# Patient Record
Sex: FEMALE | Race: WHITE | Employment: OTHER | URBAN - METROPOLITAN AREA
[De-identification: names, ages, dates, MRNs, and addresses within clinical notes are randomized per-mention and may not be internally consistent; named-entity substitution may affect disease eponyms.]

---

## 2017-04-24 ENCOUNTER — TELEPHONE (OUTPATIENT)
Dept: PEDIATRICS | Facility: CLINIC | Age: 62
End: 2017-04-24

## 2017-04-24 NOTE — LETTER
[facility]  [address]  [place] [address]  [phone]      May 5, 2017    Gi Zhao                                                                                                                                                       [address]  [place] [address]              Dear Gi,    This letter is to remind you that you are due for your mammogram, colonoscopy, cholesterol check and annual exam.    Please call the [facility] for an appointment at [phone].    If you have already had these services with another provider please call our office or send us a [employer] message with this information so we can update our records.    If you have transferred your care elsewhere, please contact our office and we would be happy to forward your records. If you have any financial concerns regarding this appointment, please call so that we can discuss this further.      Sincerely,    Sweta Gonzalez CMA

## 2017-04-24 NOTE — TELEPHONE ENCOUNTER
Panel Management Review      Patient has the following on her problem list: None      Composite cancer screening  Chart review shows that this patient is due/due soon for the following Mammogram and Colonoscopy  Summary:    Patient is due/failing the following:   COLONOSCOPY, LDL, MAMMOGRAM and PHYSICAL    Action needed:   Patient needs office visit for mammogram, colonoscopy, cholesterol check & physical.    Type of outreach:    Phone, left message for patient to call back.     Questions for provider review:    None                                                                                                                                    Sweta Gonzalez CMA    Chart routed to Care Team .

## 2017-05-01 NOTE — TELEPHONE ENCOUNTER
Called and left VM for patient to contact clinic.  Patient needs physical, cholesterol check, mammogram & colonoscopy.  Sweta Gonzalez, CMA

## 2017-09-05 ENCOUNTER — TELEPHONE (OUTPATIENT)
Dept: PEDIATRICS | Facility: CLINIC | Age: 62
End: 2017-09-05

## 2017-09-05 NOTE — LETTER
September 14, 2017      Gi Zhao  [address]  [place] [address]        Dear Gi,       We care about your health and have reviewed your health plan including your medical conditions, medications, and lab results.  Based on this review, it is recommended that you follow up regarding the following health topic(s):  -Breast Cancer Screening  -Colon Cancer Screening  -Wellness (Physical) Visit     We recommend you take the following action(s):  -schedule a WELLNESS (Physical) APPOINTMENT.  We will perform the following labs: Lipids (fasting cholesterol - nothing to eat except water and/or meds for 8-10 hours).     Please call us at the [facility] - [phone] (or use [employer]) to address the above recommendations.     Thank you for trusting [facility] and we appreciate the opportunity to serve you.  We look forward to supporting your healthcare needs in the future.    Healthy Regards,    Your Health Care Team  [facility]      Sincerely,    CARLOTTA Rausch CNP

## 2017-09-05 NOTE — TELEPHONE ENCOUNTER
Panel Management Review      Patient has the following on her problem list: None      Composite cancer screening  Chart review shows that this patient is due/due soon for the following Mammogram, Colonoscopy and Lipid, physical  Summary:    Patient is due/failing the following:   COLONOSCOPY, LDL, MAMMOGRAM, and PHYSICAL    Action needed:   Patient needs office visit for mammo, colon, physical,lipid.    Type of outreach:  2  Phone, left message for patient to call back.     Questions for provider review:    None                                                                                                                                    Lucia Bah CMA ([facility])       Chart routed to pool A

## 2017-09-11 NOTE — TELEPHONE ENCOUNTER
Called and left VM for patient to contact clinic.  Patient needs cholesterol check, mammo, colon/FIT and physical.  Sweta Gonzalez, CMA

## 2017-09-26 ENCOUNTER — TELEPHONE (OUTPATIENT)
Dept: PEDIATRICS | Facility: CLINIC | Age: 62
End: 2017-09-26

## 2020-10-24 ENCOUNTER — APPOINTMENT (OUTPATIENT)
Dept: CT IMAGING | Facility: CLINIC | Age: 65
End: 2020-10-24
Attending: EMERGENCY MEDICINE
Payer: COMMERCIAL

## 2020-10-24 ENCOUNTER — HOSPITAL ENCOUNTER (EMERGENCY)
Facility: CLINIC | Age: 65
Discharge: HOME | End: 2020-10-25
Attending: EMERGENCY MEDICINE | Admitting: EMERGENCY MEDICINE
Payer: COMMERCIAL

## 2020-10-24 DIAGNOSIS — S01.81XA LACERATION OF FOREHEAD, INITIAL ENCOUNTER: ICD-10-CM
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
## 2020-10-24 LAB
ANION GAP SERPL CALCULATED.3IONS-SCNC: 6 MMOL/L (ref 3–14)
ANISOCYTOSIS BLD QL SMEAR: SLIGHT
BASE DEFICIT BLDV-SCNC: 1 MMOL/L
BASOPHILS # BLD AUTO: 0.1 10E9/L (ref 0–0.2)
BASOPHILS NFR BLD AUTO: 0.8 %
BUN SERPL-MCNC: 17 MG/DL (ref 7–30)
CALCIUM SERPL-MCNC: 8.4 MG/DL (ref 8.5–10.1)
CHLORIDE SERPL-SCNC: 109 MMOL/L (ref 94–109)
CO2 SERPL-SCNC: 24 MMOL/L (ref 20–32)
CREAT SERPL-MCNC: 1.06 MG/DL (ref 0.52–1.04)
DIFFERENTIAL METHOD BLD: ABNORMAL
EOSINOPHIL # BLD AUTO: 0.2 10E9/L (ref 0–0.7)
EOSINOPHIL NFR BLD AUTO: 2.8 %
ERYTHROCYTE [DISTWIDTH] IN BLOOD BY AUTOMATED COUNT: 16.9 % (ref 10–15)
GFR SERPL CREATININE-BSD FRML MDRD: 55 ML/MIN/{1.73_M2}
GLUCOSE SERPL-MCNC: 125 MG/DL (ref 70–99)
HCO3 BLDV-SCNC: 25 MMOL/L (ref 21–28)
HCT VFR BLD AUTO: 36.4 % (ref 35–47)
HGB BLD-MCNC: 10.3 G/DL (ref 11.7–15.7)
IMM GRANULOCYTES # BLD: 0 10E9/L (ref 0–0.4)
IMM GRANULOCYTES NFR BLD: 0.4 %
LYMPHOCYTES # BLD AUTO: 1.3 10E9/L (ref 0.8–5.3)
LYMPHOCYTES NFR BLD AUTO: 16.6 %
MCH RBC QN AUTO: 23.9 PG (ref 26.5–33)
MCHC RBC AUTO-ENTMCNC: 28.3 G/DL (ref 31.5–36.5)
MCV RBC AUTO: 85 FL (ref 78–100)
MONOCYTES # BLD AUTO: 0.6 10E9/L (ref 0–1.3)
MONOCYTES NFR BLD AUTO: 7.7 %
NEUTROPHILS # BLD AUTO: 5.4 10E9/L (ref 1.6–8.3)
NEUTROPHILS NFR BLD AUTO: 71.7 %
NRBC # BLD AUTO: 0 10*3/UL
NRBC BLD AUTO-RTO: 0 /100
O2/TOTAL GAS SETTING VFR VENT: 0 %
OVALOCYTES BLD QL SMEAR: SLIGHT
PCO2 BLDV: 43 MM HG (ref 40–50)
PH BLDV: 7.36 PH (ref 7.32–7.43)
PLATELET # BLD AUTO: 198 10E9/L (ref 150–450)
PLATELET # BLD EST: ABNORMAL 10*3/UL
PO2 BLDV: 44 MM HG (ref 25–47)
POTASSIUM SERPL-SCNC: 4.1 MMOL/L (ref 3.4–5.3)
RBC # BLD AUTO: 4.31 10E12/L (ref 3.8–5.2)
SODIUM SERPL-SCNC: 139 MMOL/L (ref 133–144)
WBC # BLD AUTO: 7.6 10E9/L (ref 4–11)

## 2020-10-24 PROCEDURE — 85025 COMPLETE CBC W/AUTO DIFF WBC: CPT | Performed by: EMERGENCY MEDICINE

## 2020-10-24 PROCEDURE — 80048 BASIC METABOLIC PNL TOTAL CA: CPT | Performed by: EMERGENCY MEDICINE

## 2020-10-24 PROCEDURE — 82803 BLOOD GASES ANY COMBINATION: CPT | Performed by: EMERGENCY MEDICINE

## 2020-10-24 PROCEDURE — 99284 EMERGENCY DEPT VISIT MOD MDM: CPT

## 2020-10-24 PROCEDURE — 12016 RPR FE/E/EN/L/M 12.6-20.0 CM: CPT

## 2020-10-24 PROCEDURE — 70450 CT HEAD/BRAIN W/O DYE: CPT

## 2020-10-24 RX ORDER — METHYLCELLULOSE 4000CPS 30 %
POWDER (GRAM) MISCELLANEOUS
Status: DISCONTINUED
Start: 2020-10-24 | End: 2020-10-25 | Stop reason: HOSPADM

## 2020-10-24 ASSESSMENT — ENCOUNTER SYMPTOMS: WOUND: 1

## 2020-10-24 NOTE — ED AVS SNAPSHOT
[facility]  [address]  [facility] [address]  Phone: [phone]  Fax: [phone]                                    Gi Zhao   MRN: [id number]    Department: [facility]   Date of Visit: 10/24/2020           After Visit Summary Signature Page    I have received my discharge instructions, and my questions have been answered. I have discussed any challenges I see with this plan with the nurse or doctor.    ..........................................................................................................................................  Patient/Patient Representative Signature      ..........................................................................................................................................  Patient Representative Print Name and Relationship to Patient    ..................................................               ................................................  Date                                   Time    ..........................................................................................................................................  Reviewed by Signature/Title    ...................................................              ..............................................  Date                                               Time          22EPIC Rev 08/18

## 2020-10-25 VITALS
TEMPERATURE: 97.9 F | SYSTOLIC BLOOD PRESSURE: 148 MMHG | OXYGEN SATURATION: 100 % | HEART RATE: 93 BPM | DIASTOLIC BLOOD PRESSURE: 59 MMHG | RESPIRATION RATE: 20 BRPM

## 2020-10-25 RX ORDER — LIDOCAINE HYDROCHLORIDE AND EPINEPHRINE 10; 10 MG/ML; UG/ML
INJECTION, SOLUTION INFILTRATION; PERINEURAL
Status: DISCONTINUED
Start: 2020-10-25 | End: 2020-10-25 | Stop reason: HOSPADM

## 2020-10-25 NOTE — ED PROVIDER NOTES
History     Chief Complaint:  Fall and Head Laceration       HPI   Gi Zhao is a 65 year old female who presents after a fall. The patient was sitting at her dining room table tonight when she fell asleep and fell forward hitting her head on the table. She denies losing consciousness. The patient sustained a laceration to her forehead. EMS was called to the patient's home and she was subsequently transferred to the ER. She is not on blood thinners. She denies having any pain.     Allergies:  Oxycontin [Oxycodone]     Medications:    Medications reviewed. No current medications.      Past Medical History:    Medical history reviewed. No pertinent medical history.    Past Surgical History:    Arthroplasty knee   Closed reduction lower extremity   Mandible surgery   Repair retinacular   Repair tendon quadriceps    Family History:    Family history reviewed. No pertinent family history.      Social History:  Smoking Status: Never Smoker  Smokeless Tobacco: Never Used  Alcohol Use: No  Drug Use: No  PCP: Johnny Murphy     Review of Systems   Skin: Positive for wound (laceration to the forehead).   All other systems reviewed and are negative.        Physical Exam     Patient Vitals for the past 24 hrs:   BP Temp Temp src Pulse Resp SpO2   10/24/20 2319 114/69 -- -- -- -- 100 %   10/24/20 2245 -- -- -- -- -- 100 %   10/24/20 2230 (!) 141/71 -- -- 83 -- 100 %   10/24/20 2218 (!) 167/77 97.9  F (36.6  C) Oral 86 20 100 %        Physical Exam  Gen: Morbidly obese  HEENT: 9 cm horizontally oriented wound across the forehead inferior to this there is a vertically oriented 5 cm wound, no palpable underlying bony abnormality, no foreign body when examined through a bloodless field.  mmm, no rhinorrhea.  Pupils 4 mm reactive bilaterally, extraocular motor intact.  Visual acuity at baseline  Neck: supple, no abnormal swelling, no midline tenderness and painless range of motion  Lungs:  CTAB,  no resp distress  CV: rrr,  no m/r/g, ppi  Abd: soft, nontender, nondistended, no rebound/masses/guarding/hsm  Ext: no peripheral edema, skeletal survey unremarkable for significant bony tenderness palpation.  Chronic venous stasis changes in the legs bilaterally with 2+ pitting edema.  Skin: warm, dry, well perfused, no rashes/bruising/lesions on exposed skin  Neuro: alert, MAEE, no gross motor or sensory deficits, nerves II to XII intact and symmetric psych: Normal mood, normal affect        Emergency Department Course     Imaging:  Radiology findings were communicated with the patient who voiced understanding of the findings.    Head CT w/o contrast  Final Result  IMPRESSION:  1.  No acute intracranial hemorrhage or calvarial fracture.  Reading per radiology     Laboratory:  Laboratory findings were communicated with the patient who voiced understanding of the findings.    Procedures     Laceration Repair        LACERATION:  A complex clean 9 cm laceration and 5cm      LOCATION: Forehead, horizontally oriented laceration is 9 cm vertically oriented is 5      FUNCTION:  Distally sensation, circulation and motor are intact.      ANESTHESIA: Topical anesthesia and an additional 3 cc 1% lidocaine with epi      PREPARATION:  Irrigation with Normal Saline      DEBRIDEMENT:  no debridement and wound explored, no foreign body found      CLOSURE: 1 layer closure, 9 cm horizontal wound closed with 5-0 fast-absorbing gut sutures x9.  Vertically oriented 5 cm wound closed in 1 layer with 5-0 fast-absorbing gut 7 sutures     Interventions:  Medications   lidocaine/EPINEPHrine/tetracaine (LET) solution KIT (has no administration in time range)   methylcellulose powder (has no administration in time range)       Emergency Department Course:  Past medical records, nursing notes, and vitals reviewed.    2220 I performed an exam of the patient as documented above.    IV was inserted and blood was drawn for laboratory testing, results above.     The patient  was sent for imaging while in the emergency department, results above.             Impression & Plan   Covid-19  Gi Zhao was evaluated during a global COVID-19 pandemic, which necessitated consideration that the patient might be at risk for infection with the SARS-CoV-2 virus that causes COVID-19.   Applicable protocols for evaluation were followed during the patient's care.   COVID-19 was considered as part of the patient's evaluation.     Medical Decision Making:  Gi Zhao is a 65 year old female who presents to the emergency department today with forehead lacerations after falling asleep at a table and hitting her head.  CT negative for skull fracture or intracranial hemorrhage.  There is no ocular injury and her skeletal survey is otherwise unremarkable.  Basic blood tests and VBG are unremarkable.  She feels safe at home where she lives with her son and was discharged back home in a wheelchair van.  Educated on wound care.  Sutures are dissolvable.      Discharge Diagnosis:    ICD-10-CM    1. Fall, initial encounter  W19.XXXA    2. Laceration of forehead, initial encounter  S01.81XA        Disposition:  Discharged to home.    Discharge Medications:  Discharge Medication List as of 10/25/2020  1:43 AM          Scribe Disclosure:  I, Pasha Gaines, am serving as a scribe at 10:20 PM on 10/24/2020 to document services personally performed by Guzman Wilde MD based on my observations and the provider's statements to me.      10/24/2020   Guzman Wilde MD Walker, Jerome Richard, MD  10/25/20 0316

## 2020-10-25 NOTE — ED TRIAGE NOTES
"Pt arrives via [facility] from home. Per EMS, pt was sitting at jorge dining room table when she \"fell asleep\" and fell forward, hitting her head on the table. Denies loss of consciousness but has a large laceration to th forehead (mild bleeding is controlled at this time with washcloth). Pt does not take blood thinners. EMS did not start IV due to pt concern for billing and cost of care.   EMS also reports concern for \"social issues.\" State pt lives with some and is dependent on him for care. Unable to ambulate well at home even with walker. Per EMS, pt has not been to a doctor in years,.   On arrival, pt alert and answering questions. Resp even and unlabored. Skin pink, warm, and dry, ED MD at bedside on arrival.  "

## 2020-10-25 NOTE — ED NOTES
Bed: ED26  Expected date:   Expected time:   Means of arrival: Ambulance  Comments:  EDIE. 65f. Fall

## 2020-10-25 NOTE — DISCHARGE INSTRUCTIONS
Please make an appointment to follow up with your primary care provider or the emergency department in 5-7 days as needed    Stitches are absorbable and will fall out on their own usually around day 5-7.  If the stitches have not fallen out by day 7, return to clinic or ED to have them removed.      Return to ER immediately if you develop: signs of a wound infection (RED/SWOLLEN/DRAINS PUS LIKE A PIMPLE) OR you have any other concerns about your health.    Keep dressings clean    Use antibiotic ointment over wound for first 3 days        Discharge Instructions  Laceration (Cut)    You were seen today for a laceration (cut).  Your doctor examined your laceration for any problems such a buried foreign body (like glass, a splinter, or gravel), or injury to blood vessels, tendons, and nerves.  Your doctor may have also rinsed and/or scrubbed your laceration to help prevent an infection.  Your laceration may have been closed with glue, staples or sutures (stitches).      It may not be possible to find all problems with your laceration on the first visit, and we can't always prevent infections.  Antibiotics are only given when the benefit is more than the risk, and don't prevent all infections. Some lacerations are too high risk to close, and are left open to heal.  All lacerations, no matter how expertly repaired, will cause scarring.    Return to the Emergency Department right away if:  You have more redness, swelling, pain, drainage (pus), a bad smell, or red streaking from your laceration.    You have a fever of 101oF or more.  You have bleeding that you can t stop at home. If your cut starts to bleed, hold pressure on the bleeding area with a clean cloth or put pressure over the bandage.  If the bleeding doesn t stop after using constant pressure for 30 minutes, you should return to the Emergency Department for further treatment.  An area past the laceration is cool, pale, or blue compared with the other side, or has  a slower return of color when squeezed.  Your dressing seems too tight or starts to get uncomfortable or painful.  You have loss of normal function or use of an area, such as being unable to straighten or bend a finger normally.  You have a numb area past the laceration.    Return to the Emergency Department or see your regular doctor if:  The laceration starts to come open.   You have something coming out of the cut or a feeling that there is something in the laceration.  Your wound will not heal, or keeps breaking open. There can always be glass, wood, dirt or other things in any wound.  They won t always show up even on x-rays.  If a wound doesn t heal, this may be why, and it is important to follow-up with your regular doctor    Home Care:  Take your dressing off in 12 hours, or as instructed by your doctor, to check your laceration. Remove the dressing sooner if it seems too tight or painful, or if it is getting numb, tingly, or pale past the dressing.  Gently wash your laceration 2 times a day with clean cloth and soap.   It is okay to shower, but do not let the laceration soak in water.    If your laceration was closed with wound adhesive or strips: pat it dry and leave it open to the air.   For all other repairs: after you wash your laceration, or at least 2 times a day, apply bacitracin or other antibiotic ointment to the laceration, then cover it with a band-aid or gauze.  Keep the laceration clean. Wear gloves or other protective clothing if you are around dirt.    Scars:  To help minimize scarring:  Wear sunscreen over the healed laceration when out in the sun.  Massage the area regularly.  You may use Vitamin E Oil.  Wait a year.  Most scars will start to fade within a year.      Remember that you can always come back to the Emergency Department if you are not able to see your normal doctor in the amount of time listed above, if you get any new symptoms, or if there is anything that worries you.

## 2020-11-03 ENCOUNTER — PATIENT OUTREACH (OUTPATIENT)
Dept: CARE COORDINATION | Facility: CLINIC | Age: 65
End: 2020-11-03

## 2020-11-03 NOTE — LETTER
[facility] CARE COORDINATION  November 3, 2020    iG Zhao  [address] [place] [address]      Dear Gi,    I am a clinical product navigator that works on behalf of [facility] as a liaison between our clinical care teams and insurance health plans.  Our goal is to assure our patients have access to all of their primary and specialty care needs as well as additional system resources, such as: Diabetes Education, Medication Therapy Management, and Clinic Care Coordination, among other resources.     We noticed that you are due for your annual wellness visit.  Even if you are feeling well, we encourage you to have an annual check up and assure your health care needs stay on track.      Please contact the clinic at your convenience to schedule your annual wellness exam; if you are interested  or in need of establishing care with any specialty providers or resources including those described above, I am happy to assist you.    I look forward to helping you connect with providers within our health care system; please let me know if you have any questions.     Sincerely,    Cecile Mai RN   Clinical Product Navigator  PH: [phone]

## 2020-11-03 NOTE — PROGRESS NOTES
Clinical Product Navigator RN reviewed chart; patient on payer product coverage.  Review results: patient recently seen in ED, noted to also be overdue on preventive care exam.       UT/Voicemail       Clinical Data: Care Coordinator Outreach  Outreach attempted x 1.  Left message on patient's voicemail with call back information and requested return call.  Plan: Care Coordinator will send care coordination introduction letter with care coordinator contact information and explanation of care coordination services via mail.     No further outreaches will be done.     Cecile Mai RN/Clinical Product Navigator

## 2025-04-23 ENCOUNTER — APPOINTMENT (OUTPATIENT)
Dept: CT IMAGING | Facility: CLINIC | Age: 70
DRG: 872 | End: 2025-04-23
Attending: EMERGENCY MEDICINE
Payer: COMMERCIAL

## 2025-04-23 ENCOUNTER — APPOINTMENT (OUTPATIENT)
Dept: GENERAL RADIOLOGY | Facility: CLINIC | Age: 70
DRG: 872 | End: 2025-04-23
Attending: EMERGENCY MEDICINE
Payer: COMMERCIAL

## 2025-04-23 ENCOUNTER — HOSPITAL ENCOUNTER (INPATIENT)
Facility: CLINIC | Age: 70
DRG: 872 | End: 2025-04-23
Attending: EMERGENCY MEDICINE | Admitting: INTERNAL MEDICINE
Payer: COMMERCIAL

## 2025-04-23 DIAGNOSIS — A41.9 SEPSIS, DUE TO UNSPECIFIED ORGANISM, UNSPECIFIED WHETHER ACUTE ORGAN DYSFUNCTION PRESENT (H): ICD-10-CM

## 2025-04-23 DIAGNOSIS — I48.91 ATRIAL FIBRILLATION WITH RAPID VENTRICULAR RESPONSE (H): ICD-10-CM

## 2025-04-23 DIAGNOSIS — R53.1 WEAKNESS: ICD-10-CM

## 2025-04-23 DIAGNOSIS — N30.01 ACUTE CYSTITIS WITH HEMATURIA: ICD-10-CM

## 2025-04-23 LAB
ALBUMIN UR-MCNC: 20 MG/DL
ANION GAP SERPL CALCULATED.3IONS-SCNC: 14 MMOL/L (ref 7–15)
APPEARANCE UR: CLEAR
BACTERIA #/AREA URNS HPF: ABNORMAL /HPF
BASOPHILS # BLD AUTO: 0 10E3/UL (ref 0–0.2)
BASOPHILS NFR BLD AUTO: 0 %
BILIRUB UR QL STRIP: NEGATIVE
BUN SERPL-MCNC: 16.6 MG/DL (ref 8–23)
CALCIUM SERPL-MCNC: 9.3 MG/DL (ref 8.8–10.4)
CHLORIDE SERPL-SCNC: 107 MMOL/L (ref 98–107)
COLOR UR AUTO: ABNORMAL
CREAT SERPL-MCNC: 1.17 MG/DL (ref 0.51–0.95)
EGFRCR SERPLBLD CKD-EPI 2021: 50 ML/MIN/1.73M2
EOSINOPHIL # BLD AUTO: 0 10E3/UL (ref 0–0.7)
EOSINOPHIL NFR BLD AUTO: 0 %
ERYTHROCYTE [DISTWIDTH] IN BLOOD BY AUTOMATED COUNT: 14.6 % (ref 10–15)
GLUCOSE SERPL-MCNC: 135 MG/DL (ref 70–99)
GLUCOSE UR STRIP-MCNC: NEGATIVE MG/DL
HCO3 SERPL-SCNC: 20 MMOL/L (ref 22–29)
HCT VFR BLD AUTO: 37.3 % (ref 35–47)
HGB BLD-MCNC: 12.4 G/DL (ref 11.7–15.7)
HGB UR QL STRIP: ABNORMAL
HOLD SPECIMEN: NORMAL
HOLD SPECIMEN: NORMAL
HYALINE CASTS: 1 /LPF
IMM GRANULOCYTES # BLD: 0.1 10E3/UL
IMM GRANULOCYTES NFR BLD: 0 %
KETONES UR STRIP-MCNC: ABNORMAL MG/DL
LACTATE SERPL-SCNC: 1.7 MMOL/L (ref 0.7–2)
LACTATE SERPL-SCNC: 2.8 MMOL/L (ref 0.7–2)
LEUKOCYTE ESTERASE UR QL STRIP: ABNORMAL
LYMPHOCYTES # BLD AUTO: 0.7 10E3/UL (ref 0.8–5.3)
LYMPHOCYTES NFR BLD AUTO: 5 %
MAGNESIUM SERPL-MCNC: 1.9 MG/DL (ref 1.7–2.3)
MCH RBC QN AUTO: 31.2 PG (ref 26.5–33)
MCHC RBC AUTO-ENTMCNC: 33.2 G/DL (ref 31.5–36.5)
MCV RBC AUTO: 94 FL (ref 78–100)
MONOCYTES # BLD AUTO: 0.7 10E3/UL (ref 0–1.3)
MONOCYTES NFR BLD AUTO: 6 %
MUCOUS THREADS #/AREA URNS LPF: PRESENT /LPF
NEUTROPHILS # BLD AUTO: 11.2 10E3/UL (ref 1.6–8.3)
NEUTROPHILS NFR BLD AUTO: 88 %
NITRATE UR QL: NEGATIVE
NRBC # BLD AUTO: 0 10E3/UL
NRBC BLD AUTO-RTO: 0 /100
PH UR STRIP: 6.5 [PH] (ref 5–7)
PHOSPHATE SERPL-MCNC: 2.9 MG/DL (ref 2.5–4.5)
PLATELET # BLD AUTO: 172 10E3/UL (ref 150–450)
POTASSIUM SERPL-SCNC: 4.3 MMOL/L (ref 3.4–5.3)
RBC # BLD AUTO: 3.97 10E6/UL (ref 3.8–5.2)
RBC URINE: 14 /HPF
SODIUM SERPL-SCNC: 141 MMOL/L (ref 135–145)
SP GR UR STRIP: 1.01 (ref 1–1.03)
SQUAMOUS EPITHELIAL: 1 /HPF
T4 FREE SERPL-MCNC: 0.87 NG/DL (ref 0.9–1.7)
TSH SERPL DL<=0.005 MIU/L-ACNC: 24.51 UIU/ML (ref 0.3–4.2)
UROBILINOGEN UR STRIP-MCNC: NORMAL MG/DL
WBC # BLD AUTO: 12.6 10E3/UL (ref 4–11)
WBC URINE: 33 /HPF

## 2025-04-23 PROCEDURE — 83605 ASSAY OF LACTIC ACID: CPT | Performed by: EMERGENCY MEDICINE

## 2025-04-23 PROCEDURE — 84100 ASSAY OF PHOSPHORUS: CPT | Performed by: INTERNAL MEDICINE

## 2025-04-23 PROCEDURE — 70450 CT HEAD/BRAIN W/O DYE: CPT

## 2025-04-23 PROCEDURE — 80048 BASIC METABOLIC PNL TOTAL CA: CPT | Performed by: EMERGENCY MEDICINE

## 2025-04-23 PROCEDURE — 99285 EMERGENCY DEPT VISIT HI MDM: CPT | Mod: 25

## 2025-04-23 PROCEDURE — 250N000011 HC RX IP 250 OP 636: Performed by: EMERGENCY MEDICINE

## 2025-04-23 PROCEDURE — 84443 ASSAY THYROID STIM HORMONE: CPT | Performed by: EMERGENCY MEDICINE

## 2025-04-23 PROCEDURE — 250N000013 HC RX MED GY IP 250 OP 250 PS 637: Performed by: HOSPITALIST

## 2025-04-23 PROCEDURE — 93005 ELECTROCARDIOGRAM TRACING: CPT | Mod: 76

## 2025-04-23 PROCEDURE — 87186 SC STD MICRODIL/AGAR DIL: CPT | Performed by: EMERGENCY MEDICINE

## 2025-04-23 PROCEDURE — 83735 ASSAY OF MAGNESIUM: CPT | Performed by: EMERGENCY MEDICINE

## 2025-04-23 PROCEDURE — 72125 CT NECK SPINE W/O DYE: CPT

## 2025-04-23 PROCEDURE — 71046 X-RAY EXAM CHEST 2 VIEWS: CPT

## 2025-04-23 PROCEDURE — 87040 BLOOD CULTURE FOR BACTERIA: CPT | Performed by: EMERGENCY MEDICINE

## 2025-04-23 PROCEDURE — 93005 ELECTROCARDIOGRAM TRACING: CPT

## 2025-04-23 PROCEDURE — 258N000003 HC RX IP 258 OP 636: Performed by: EMERGENCY MEDICINE

## 2025-04-23 PROCEDURE — 36415 COLL VENOUS BLD VENIPUNCTURE: CPT | Performed by: EMERGENCY MEDICINE

## 2025-04-23 PROCEDURE — 84439 ASSAY OF FREE THYROXINE: CPT | Performed by: EMERGENCY MEDICINE

## 2025-04-23 PROCEDURE — 120N000001 HC R&B MED SURG/OB

## 2025-04-23 PROCEDURE — 81001 URINALYSIS AUTO W/SCOPE: CPT | Performed by: EMERGENCY MEDICINE

## 2025-04-23 PROCEDURE — 85025 COMPLETE CBC W/AUTO DIFF WBC: CPT | Performed by: EMERGENCY MEDICINE

## 2025-04-23 PROCEDURE — 85004 AUTOMATED DIFF WBC COUNT: CPT | Performed by: EMERGENCY MEDICINE

## 2025-04-23 PROCEDURE — 99222 1ST HOSP IP/OBS MODERATE 55: CPT | Performed by: INTERNAL MEDICINE

## 2025-04-23 PROCEDURE — 96360 HYDRATION IV INFUSION INIT: CPT

## 2025-04-23 RX ORDER — AMOXICILLIN 250 MG
2 CAPSULE ORAL 2 TIMES DAILY PRN
Status: DISCONTINUED | OUTPATIENT
Start: 2025-04-23 | End: 2025-04-26 | Stop reason: HOSPADM

## 2025-04-23 RX ORDER — CEFTRIAXONE 2 G/1
2 INJECTION, POWDER, FOR SOLUTION INTRAMUSCULAR; INTRAVENOUS EVERY 24 HOURS
Status: DISCONTINUED | OUTPATIENT
Start: 2025-04-24 | End: 2025-04-26 | Stop reason: ALTCHOICE

## 2025-04-23 RX ORDER — LIDOCAINE 40 MG/G
CREAM TOPICAL
Status: DISCONTINUED | OUTPATIENT
Start: 2025-04-23 | End: 2025-04-26 | Stop reason: HOSPADM

## 2025-04-23 RX ORDER — CYCLOBENZAPRINE HCL 5 MG
5 TABLET ORAL 3 TIMES DAILY
Status: COMPLETED | OUTPATIENT
Start: 2025-04-23 | End: 2025-04-24

## 2025-04-23 RX ORDER — ONDANSETRON 2 MG/ML
4 INJECTION INTRAMUSCULAR; INTRAVENOUS EVERY 6 HOURS PRN
Status: DISCONTINUED | OUTPATIENT
Start: 2025-04-23 | End: 2025-04-26 | Stop reason: HOSPADM

## 2025-04-23 RX ORDER — CALCIUM CARBONATE 500 MG/1
1000 TABLET, CHEWABLE ORAL 4 TIMES DAILY PRN
Status: DISCONTINUED | OUTPATIENT
Start: 2025-04-23 | End: 2025-04-26 | Stop reason: HOSPADM

## 2025-04-23 RX ORDER — CEFTRIAXONE 1 G/1
1 INJECTION, POWDER, FOR SOLUTION INTRAMUSCULAR; INTRAVENOUS EVERY 24 HOURS
Status: DISCONTINUED | OUTPATIENT
Start: 2025-04-24 | End: 2025-04-23

## 2025-04-23 RX ORDER — CALCIUM CARBONATE 500 MG/1
1000 TABLET, CHEWABLE ORAL DAILY PRN
Status: DISCONTINUED | OUTPATIENT
Start: 2025-04-23 | End: 2025-04-23

## 2025-04-23 RX ORDER — CEFTRIAXONE 2 G/1
2 INJECTION, POWDER, FOR SOLUTION INTRAMUSCULAR; INTRAVENOUS ONCE
Status: COMPLETED | OUTPATIENT
Start: 2025-04-23 | End: 2025-04-23

## 2025-04-23 RX ORDER — AMOXICILLIN 250 MG
1 CAPSULE ORAL 2 TIMES DAILY PRN
Status: DISCONTINUED | OUTPATIENT
Start: 2025-04-23 | End: 2025-04-26 | Stop reason: HOSPADM

## 2025-04-23 RX ORDER — PROCHLORPERAZINE MALEATE 5 MG/1
5 TABLET ORAL EVERY 6 HOURS PRN
Status: DISCONTINUED | OUTPATIENT
Start: 2025-04-23 | End: 2025-04-26 | Stop reason: HOSPADM

## 2025-04-23 RX ORDER — ACETAMINOPHEN 325 MG/1
975 TABLET ORAL EVERY 8 HOURS PRN
Status: DISCONTINUED | OUTPATIENT
Start: 2025-04-23 | End: 2025-04-24

## 2025-04-23 RX ORDER — ONDANSETRON 4 MG/1
4 TABLET, ORALLY DISINTEGRATING ORAL EVERY 6 HOURS PRN
Status: DISCONTINUED | OUTPATIENT
Start: 2025-04-23 | End: 2025-04-26 | Stop reason: HOSPADM

## 2025-04-23 RX ORDER — SENNOSIDES 8.6 MG
1300 CAPSULE ORAL EVERY 8 HOURS PRN
COMMUNITY

## 2025-04-23 RX ORDER — SALIVA STIMULANT COMB. NO.3
1 SPRAY, NON-AEROSOL (ML) MUCOUS MEMBRANE 4 TIMES DAILY
Status: DISCONTINUED | OUTPATIENT
Start: 2025-04-23 | End: 2025-04-26 | Stop reason: HOSPADM

## 2025-04-23 RX ORDER — LEVOTHYROXINE SODIUM 25 UG/1
25 TABLET ORAL
Status: DISCONTINUED | OUTPATIENT
Start: 2025-04-24 | End: 2025-04-26 | Stop reason: HOSPADM

## 2025-04-23 RX ADMIN — SODIUM CHLORIDE 2000 ML: 9 INJECTION, SOLUTION INTRAVENOUS at 17:00

## 2025-04-23 RX ADMIN — CYCLOBENZAPRINE HYDROCHLORIDE 5 MG: 5 TABLET, FILM COATED ORAL at 23:34

## 2025-04-23 RX ADMIN — CEFTRIAXONE SODIUM 2 G: 2 INJECTION, POWDER, FOR SOLUTION INTRAMUSCULAR; INTRAVENOUS at 19:30

## 2025-04-23 ASSESSMENT — COLUMBIA-SUICIDE SEVERITY RATING SCALE - C-SSRS
6. HAVE YOU EVER DONE ANYTHING, STARTED TO DO ANYTHING, OR PREPARED TO DO ANYTHING TO END YOUR LIFE?: NO
2. HAVE YOU ACTUALLY HAD ANY THOUGHTS OF KILLING YOURSELF IN THE PAST MONTH?: NO
1. IN THE PAST MONTH, HAVE YOU WISHED YOU WERE DEAD OR WISHED YOU COULD GO TO SLEEP AND NOT WAKE UP?: NO

## 2025-04-23 ASSESSMENT — ACTIVITIES OF DAILY LIVING (ADL)
ADLS_ACUITY_SCORE: 41
ADLS_ACUITY_SCORE: 54
ADLS_ACUITY_SCORE: 54
ADLS_ACUITY_SCORE: 41

## 2025-04-23 NOTE — ED PROVIDER NOTES
Emergency Department Note      History of Present Illness     Chief Complaint   Generalized Weakness    HPI   Gi Zhao is a 70 year old female presenting with generalized weakness. The patient reports having the flu about one month ago, and she has not recovered since. Gi has been in bed frequently. She was having difficulty getting out of bed today, noting she couldn't turn herself around to get her feet on the floor. She ended up getting wedged between her bed and a vanity around 1315 after trying to get up. The patient reports her head was wedged near her chest and she had difficulty breathing. One hour after (1415), her son came home and called 911. She endorses posterior head pain and was experiencing neck pain that has since improved. The patient has had a decreased appetite. No recent chest pain, shortness of breath, palpations or pain with urination. No history of atrial fibrillation.     Independent Historian   None    Review of External Notes   Reviewed note from 11/29/2016 for dysuria.  Otherwise appears to be healthy.  No obvious other concerns.    Past Medical History     Medical History and Problem List   The patient denies any significant past medical history.     Medications   Aspirin 325 mg     Surgical History   Knee arthroplasty, bilateral   Closed fracture reduction   Mandible surgery   Repair tendon quadriceps     Physical Exam     Patient Vitals for the past 24 hrs:   BP Temp Pulse Resp SpO2 Weight   04/23/25 1844 -- -- -- -- 100 % --   04/23/25 1843 -- -- -- -- 100 % --   04/23/25 1842 -- -- -- -- 100 % --   04/23/25 1841 -- -- -- -- 98 % --   04/23/25 1840 -- -- -- -- 99 % --   04/23/25 1839 -- -- -- -- 99 % --   04/23/25 1838 (!) 158/95 -- 84 -- -- --   04/23/25 1738 -- -- 112 -- 100 % --   04/23/25 1737 -- -- 103 -- 100 % --   04/23/25 1736 -- -- 118 -- 100 % --   04/23/25 1735 -- -- 107 -- 99 % --   04/23/25 1734 -- -- (!) 123 -- 100 % --   04/23/25 1733 -- -- 100 -- 100 % --    04/23/25 1732 -- -- 110 -- 100 % --   04/23/25 1731 -- -- 101 -- 100 % --   04/23/25 1637 -- -- (!) 142 -- 100 % --   04/23/25 1636 -- -- (!) 142 -- 100 % --   04/23/25 1635 115/88 -- (!) 154 -- -- --   04/23/25 1540 104/83 98.3  F (36.8  C) 91 18 98 % 119.2 kg (262 lb 12.6 oz)     Physical Exam  General: Resting on the bed.  Head: No obvious trauma to head.  Reports tenderness to palpation of the left posterior occipital without any appreciable laceration.  Ears, Nose, Throat:  External ears normal.  Nose normal.  Dry MM  Eyes:  Conjunctivae clear.  Pupils are equal, round, and reactive.   Neck: Normal range of motion.  Neck supple.   CV: Tachycardic, irregularly irregular rate and rhythm.  No murmurs.      Respiratory: Effort normal and breath sounds normal.  No wheezing or crackles.   Gastrointestinal: Soft.  No distension. There is no tenderness.  There is no rigidity, no rebound and no guarding.   Musculoskeletal: Normal range of motion.  Non tender extremities to palpations.  Nontender cervical, thoracic, lumbar spine without step-off or deformity.  Stable pelvis.    Neuro: Alert. Moving all extremities appropriately.  Normal speech.  CN II-XII grossly intact.  Gross muscle strength intact of the proximal and distal bilateral upper and lower extremities.  Sensation intact to light touch in all 4 extremities.     Skin: Skin is warm and dry.  No rash noted.  Scattered ecchymosis noted to the bilateral lower extremities in various stages of healing.  Dried stool overlying the anterior lower legs.      Diagnostics     Lab Results   Labs Ordered and Resulted from Time of ED Arrival to Time of ED Departure   BASIC METABOLIC PANEL - Abnormal       Result Value    Sodium 141      Potassium 4.3      Chloride 107      Carbon Dioxide (CO2) 20 (*)     Anion Gap 14      Urea Nitrogen 16.6      Creatinine 1.17 (*)     GFR Estimate 50 (*)     Calcium 9.3      Glucose 135 (*)    CBC WITH PLATELETS AND DIFFERENTIAL -  Abnormal    WBC Count 12.6 (*)     RBC Count 3.97      Hemoglobin 12.4      Hematocrit 37.3      MCV 94      MCH 31.2      MCHC 33.2      RDW 14.6      Platelet Count 172      % Neutrophils 88      % Lymphocytes 5      % Monocytes 6      % Eosinophils 0      % Basophils 0      % Immature Granulocytes 0      NRBCs per 100 WBC 0      Absolute Neutrophils 11.2 (*)     Absolute Lymphocytes 0.7 (*)     Absolute Monocytes 0.7      Absolute Eosinophils 0.0      Absolute Basophils 0.0      Absolute Immature Granulocytes 0.1      Absolute NRBCs 0.0     LACTIC ACID WHOLE BLOOD WITH 1X REPEAT IN 2 HR WHEN >2 - Abnormal    Lactic Acid, Initial 2.8 (*)    ROUTINE UA WITH MICROSCOPIC REFLEX TO CULTURE - Abnormal    Color Urine Light Yellow      Appearance Urine Clear      Glucose Urine Negative      Bilirubin Urine Negative      Ketones Urine Trace (*)     Specific Gravity Urine 1.010      Blood Urine Small (*)     pH Urine 6.5      Protein Albumin Urine 20 (*)     Urobilinogen Urine Normal      Nitrite Urine Negative      Leukocyte Esterase Urine Large (*)     Bacteria Urine Many (*)     Mucus Urine Present (*)     RBC Urine 14 (*)     WBC Urine 33 (*)     Squamous Epithelials Urine 1      Hyaline Casts Urine 1     TSH WITH FREE T4 REFLEX - Abnormal    TSH 24.51 (*)    T4 FREE - Abnormal    Free T4 0.87 (*)    MAGNESIUM - Normal    Magnesium 1.9     LACTIC ACID WHOLE BLOOD - Normal    Lactic Acid 1.7     BLOOD CULTURE   BLOOD CULTURE   URINE CULTURE     Imaging   XR Chest 2 Views   Final Result   IMPRESSION: Technically limited exam. Heart size is likely enlarged. Prominence of central pulmonary vasculature and increased interstitial density suspicious for early edema. No lobar consolidation, effusions, or pneumothorax. Advanced degenerative    changes in both shoulders, left greater than right.      CT Cervical Spine w/o Contrast   Preliminary Result   IMPRESSION:   HEAD CT:   1.  Superficial soft tissue contusion/subgaleal  hematoma is seen along the posterior superior parietal scalp.   2.  No finding for intracranial hemorrhage, mass, or acute infarct.   3.  Mild volume loss and presumed sequela chronic microvascular ischemic change.      CERVICAL SPINE CT:   1.  Moderate to advanced cervical spondylosis without finding for acute fracture.   2.  Grade I anterolisthesis C3 on C4, C4 on C5 and C7 on T1. Listhesis at these levels is age-indeterminate but favored to be chronic and degenerative in etiology.         CT Head w/o Contrast   Preliminary Result   IMPRESSION:   HEAD CT:   1.  Superficial soft tissue contusion/subgaleal hematoma is seen along the posterior superior parietal scalp.   2.  No finding for intracranial hemorrhage, mass, or acute infarct.   3.  Mild volume loss and presumed sequela chronic microvascular ischemic change.      CERVICAL SPINE CT:   1.  Moderate to advanced cervical spondylosis without finding for acute fracture.   2.  Grade I anterolisthesis C3 on C4, C4 on C5 and C7 on T1. Listhesis at these levels is age-indeterminate but favored to be chronic and degenerative in etiology.           EKG   ECG taken at 1555, ECG read at 1649  Atrial fibrillation with rapid ventricular response with premature ventricular or aberrantly conducted complexes  Nonspecific T wave abnormality   Abnormal ECG   Rate 131 bpm. SD interval * ms. QRS duration 84 ms. QT/QTc 290/428 ms. P-R-T axes * 43 -18.    ECG taken at 1629, ECG read at 1649  Sinus tachycardia - probably atrial flutter   Nonspecific ST abnormality   Abnormal ECG   New rhythm as compared to prior, dated 3/17/15.  Rate 141 bpm. SD interval 136 ms. QRS duration 76 ms. QT/QTc 312/477 ms. P-R-T axes * 44 10.    EKG done at 1908.  Normal sinus rhythm.  Possible left atrial enlargement.  Borderline EKG.  Rate 83.  .  QRS 82.  QT/QTc 370/439.  PRT axis 65 space 39 space 15.  Compared to previous EKGs today 4/23/2025 resolution of atrial fibrillation.  Now reverted to  sinus rhythm.  No acute ST-T wave changes.    Independent Interpretation   CXR: No infiltrate.  CT Head: No intracranial hemorrhage.    ED Course      Medications Administered   Medications   cefTRIAXone (ROCEPHIN) 2 g vial to attach to  ml bag for ADULTS or NS 50 ml bag for PEDS (has no administration in time range)   sodium chloride 0.9% BOLUS 2,000 mL (0 mLs Intravenous Stopped 4/23/25 1809)       Procedures   Procedures     Discussion of Management   Admitting Hospitalist, see below    ED Course   ED Course as of 04/23/25 1942 Wed Apr 23, 2025   1636 I obtained the history and examined the patient as noted above.      1905 I reassessed the patient    1931 I spoke with Dr Shepard for admission        Additional Documentation  None    Medical Decision Making / Diagnosis     CMS Diagnoses: IV Antibiotics given and/or elevated Lactate of 2.8 and no sepsis note found - Delete this reminder and enter the sepsis note or '.edcms' before signing chart.>>>The patient has signs of sepsis   Sepsis ED evaluation   The patient has signs of sepsis as evidenced by:  1. Presence of 2 SIRS criteria, suspected infection, AND  2. Organ dysfunction: Lactic Acidosis with value >2.0 due to sepsis    Sepsis Care Initiation: Starting at  602 PM on 04/23/25, until specified. Prior to this documentation, sepsis, severe sepsis, or septic shock was NOT thought to be a significant cause of illness. This order represents the first time infection was seriously considered to be affecting the patient.    Lactic Acid Results:  Recent Labs   Lab Test 04/23/25 1809 04/23/25  1553   LACT 1.7 2.8*       3 Hour Bundle 6 Hour Bundle (Reassessment)   Blood Cultures before IV Antibiotics: Yes  Antibiotics given: see below  Prehospital fluid volume (mL):                     Total fluids given (ED +Pre-hospital):  The patient responded to a lesser volume of IV fluids. The initial volume ordered was 2000 mL.    Repeat Lactic Acid Level: Ordered by  reflex for 2 hours after initial lactic acid collection.  Vasopressors: MAP>65 after initial IVF bolus, will continue to monitor fluid status and vital signs.  Repeat perfusion exam: I attest to having performed a repeat sepsis exam and assessment of perfusion at  705 PM.   BMI Readings from Last 1 Encounters:   04/23/25 41.78 kg/m        Anti-infectives (From admission through now)      Start     Dose/Rate Route Frequency Ordered Stop    04/23/25 1850  cefTRIAXone (ROCEPHIN) 2 g vial to attach to  ml bag for ADULTS or NS 50 ml bag for PEDS         2 g  over 30 Minutes Intravenous ONCE 04/23/25 1849                  MIPS       None    MDM   Gi Zhao is a 70 year old female who presents to the emergency department with generalized weakness and falls.  Vital signs are reassuring, initially was quite tachycardic but this did improve with fluids.  Broad differential was considered including not limited to severe sepsis, septic shock, dehydration, electrolyte, metabolic or renal dysfunction, stroke, intracranial hemorrhage, etc.  CBC shows mild leukocytosis but no anemia.  Lactate is minimally elevated 2.8, cleared with fluids.  BMP does show mild JODIE but no electrolyte, metabolic abnormalities.  Thyroid function TSH is elevated, free T4 is minimally low.  Will continue to monitor.  Magnesium is normal.  Initial EKG shows atrial fibrillation, repeat shows more of an atrial flutter.  This is repeated after fluids as it does appear the patient's heart rate markedly improved.  Patient did convert from atrial fibrillation to sinus rhythm with fluids alone.  Chest x-ray shows no pneumonia, possible early edema the patient has no shortness of breath or hypoxia.  Will continue to monitor.  Head CT as well as cervical spine CT were negative for fracture, acute intra hemorrhage or other acute pathology.  Patient has no other evidence of trauma on head to toe exam.  Otherwise well-appearing nontoxic.  Plan for  admission for sepsis related to UTI.  Admitted to the hospitalist.    Disposition   The patient was admitted to the hospital.     Diagnosis     ICD-10-CM    1. Sepsis, due to unspecified organism, unspecified whether acute organ dysfunction present (H)  A41.9       2. Atrial fibrillation with rapid ventricular response (H)  I48.91       3. Acute cystitis with hematuria  N30.01       4. Weakness  R53.1            Discharge Medications   New Prescriptions    No medications on file     Scribe Disclosure:  Manuela ARAGON, am serving as a scribe at 4:23 PM on 4/23/2025 to document services personally performed by Jyoti Khan MD based on my observations and the provider's statements to me.        Jyoti Khan MD  04/23/25 2036

## 2025-04-23 NOTE — ED TRIAGE NOTES
Pt arrives from home after a fall earlier today wherein she rolled out of bed. Son called EMS and when they arrived she was on the ground with her head wedged between the bed and the wall. She states she was on the ground for about an hour. Reporting increasing weakness with difficulty getting to and from the bathroom. Reporting increased bilateral knee weakness; hx of bilateral knee replacement. Scattered bruises noted across knees and arms. Pt states this is the only fall she has had. Per EMS pt's mattress was soaked with urine.

## 2025-04-23 NOTE — PHARMACY-ADMISSION MEDICATION HISTORY
Pharmacy Intern Admission Medication History    Admission medication history is complete. The information provided in this note is only as accurate as the sources available at the time of the update.    Information Source(s): Patient and CareEverywhere/SureScripts via in-person    Pertinent Information: None    Changes made to PTA medication list:  Added: Tylenol 650 mg  Deleted: ibuprofen, PERCOCET, aspirin  Changed: None    Allergies reviewed with patient and updates made in EHR: yes    Medication History Completed By: Zeeshan Gillis 4/23/2025 5:50 PM    PTA Med List   Medication Sig Last Dose/Taking    acetaminophen (TYLENOL 8 HOUR ARTHRITIS PAIN) 650 MG CR tablet Take 1,300 mg by mouth every 8 hours as needed for mild pain or fever. 4/23/2025 Morning

## 2025-04-24 ENCOUNTER — APPOINTMENT (OUTPATIENT)
Dept: CARDIOLOGY | Facility: CLINIC | Age: 70
DRG: 872 | End: 2025-04-24
Attending: HOSPITALIST
Payer: COMMERCIAL

## 2025-04-24 ENCOUNTER — APPOINTMENT (OUTPATIENT)
Dept: PHYSICAL THERAPY | Facility: CLINIC | Age: 70
DRG: 872 | End: 2025-04-24
Attending: INTERNAL MEDICINE
Payer: COMMERCIAL

## 2025-04-24 VITALS
WEIGHT: 264.77 LBS | RESPIRATION RATE: 18 BRPM | DIASTOLIC BLOOD PRESSURE: 66 MMHG | HEART RATE: 81 BPM | OXYGEN SATURATION: 98 % | BODY MASS INDEX: 40.13 KG/M2 | TEMPERATURE: 99 F | HEIGHT: 68 IN | SYSTOLIC BLOOD PRESSURE: 132 MMHG

## 2025-04-24 LAB
ANION GAP SERPL CALCULATED.3IONS-SCNC: 13 MMOL/L (ref 7–15)
ATRIAL RATE - MUSE: 267 BPM
BACTERIA BLD CULT: NORMAL
BACTERIA BLD CULT: NORMAL
BACTERIA UR CULT: ABNORMAL
BUN SERPL-MCNC: 16.3 MG/DL (ref 8–23)
CALCIUM SERPL-MCNC: 9.2 MG/DL (ref 8.8–10.4)
CHLORIDE SERPL-SCNC: 108 MMOL/L (ref 98–107)
CREAT SERPL-MCNC: 1.02 MG/DL (ref 0.51–0.95)
DIASTOLIC BLOOD PRESSURE - MUSE: NORMAL MMHG
EGFRCR SERPLBLD CKD-EPI 2021: 59 ML/MIN/1.73M2
ERYTHROCYTE [DISTWIDTH] IN BLOOD BY AUTOMATED COUNT: 14.6 % (ref 10–15)
GLUCOSE SERPL-MCNC: 99 MG/DL (ref 70–99)
HCO3 SERPL-SCNC: 20 MMOL/L (ref 22–29)
HCT VFR BLD AUTO: 34.6 % (ref 35–47)
HGB BLD-MCNC: 11.4 G/DL (ref 11.7–15.7)
INTERPRETATION ECG - MUSE: NORMAL
LVEF ECHO: NORMAL
MAGNESIUM SERPL-MCNC: 1.9 MG/DL (ref 1.7–2.3)
MCH RBC QN AUTO: 30.7 PG (ref 26.5–33)
MCHC RBC AUTO-ENTMCNC: 32.9 G/DL (ref 31.5–36.5)
MCV RBC AUTO: 93 FL (ref 78–100)
P AXIS - MUSE: NORMAL DEGREES
PHOSPHATE SERPL-MCNC: 2.9 MG/DL (ref 2.5–4.5)
PLATELET # BLD AUTO: 154 10E3/UL (ref 150–450)
POTASSIUM SERPL-SCNC: 4 MMOL/L (ref 3.4–5.3)
PR INTERVAL - MUSE: NORMAL MS
QRS DURATION - MUSE: 84 MS
QT - MUSE: 290 MS
QTC - MUSE: 428 MS
R AXIS - MUSE: 43 DEGREES
RBC # BLD AUTO: 3.71 10E6/UL (ref 3.8–5.2)
SODIUM SERPL-SCNC: 141 MMOL/L (ref 135–145)
SYSTOLIC BLOOD PRESSURE - MUSE: NORMAL MMHG
T AXIS - MUSE: -18 DEGREES
VENTRICULAR RATE- MUSE: 131 BPM
WBC # BLD AUTO: 7.9 10E3/UL (ref 4–11)

## 2025-04-24 PROCEDURE — 999N000208 ECHOCARDIOGRAM COMPLETE

## 2025-04-24 PROCEDURE — 97530 THERAPEUTIC ACTIVITIES: CPT | Mod: GP

## 2025-04-24 PROCEDURE — 250N000013 HC RX MED GY IP 250 OP 250 PS 637: Performed by: INTERNAL MEDICINE

## 2025-04-24 PROCEDURE — 85027 COMPLETE CBC AUTOMATED: CPT | Performed by: INTERNAL MEDICINE

## 2025-04-24 PROCEDURE — 99232 SBSQ HOSP IP/OBS MODERATE 35: CPT | Performed by: HOSPITALIST

## 2025-04-24 PROCEDURE — 120N000001 HC R&B MED SURG/OB

## 2025-04-24 PROCEDURE — G0463 HOSPITAL OUTPT CLINIC VISIT: HCPCS

## 2025-04-24 PROCEDURE — 250N000011 HC RX IP 250 OP 636: Performed by: INTERNAL MEDICINE

## 2025-04-24 PROCEDURE — 80048 BASIC METABOLIC PNL TOTAL CA: CPT | Performed by: INTERNAL MEDICINE

## 2025-04-24 PROCEDURE — 97161 PT EVAL LOW COMPLEX 20 MIN: CPT | Mod: GP

## 2025-04-24 PROCEDURE — 93306 TTE W/DOPPLER COMPLETE: CPT | Mod: 26 | Performed by: INTERNAL MEDICINE

## 2025-04-24 PROCEDURE — 83735 ASSAY OF MAGNESIUM: CPT | Performed by: INTERNAL MEDICINE

## 2025-04-24 PROCEDURE — 250N000013 HC RX MED GY IP 250 OP 250 PS 637: Performed by: HOSPITALIST

## 2025-04-24 PROCEDURE — 255N000002 HC RX 255 OP 636: Performed by: HOSPITALIST

## 2025-04-24 PROCEDURE — 84100 ASSAY OF PHOSPHORUS: CPT | Performed by: INTERNAL MEDICINE

## 2025-04-24 PROCEDURE — 36415 COLL VENOUS BLD VENIPUNCTURE: CPT | Performed by: INTERNAL MEDICINE

## 2025-04-24 RX ORDER — HYDROXYZINE HYDROCHLORIDE 25 MG/1
25 TABLET, FILM COATED ORAL EVERY 6 HOURS PRN
Status: DISCONTINUED | OUTPATIENT
Start: 2025-04-24 | End: 2025-04-26 | Stop reason: HOSPADM

## 2025-04-24 RX ORDER — ACETAMINOPHEN 325 MG/1
975 TABLET ORAL EVERY 6 HOURS PRN
Status: DISCONTINUED | OUTPATIENT
Start: 2025-04-24 | End: 2025-04-26 | Stop reason: HOSPADM

## 2025-04-24 RX ADMIN — APIXABAN 5 MG: 5 TABLET, FILM COATED ORAL at 10:49

## 2025-04-24 RX ADMIN — HUMAN ALBUMIN MICROSPHERES AND PERFLUTREN 3 ML: 10; .22 INJECTION, SOLUTION INTRAVENOUS at 14:17

## 2025-04-24 RX ADMIN — DICLOFENAC SODIUM 2 G: 10 GEL TOPICAL at 14:40

## 2025-04-24 RX ADMIN — Medication 1 SPRAY: at 15:37

## 2025-04-24 RX ADMIN — LEVOTHYROXINE SODIUM 25 MCG: 0.03 TABLET ORAL at 08:11

## 2025-04-24 RX ADMIN — HYDROXYZINE HYDROCHLORIDE 25 MG: 25 TABLET, FILM COATED ORAL at 05:31

## 2025-04-24 RX ADMIN — Medication 1 SPRAY: at 08:12

## 2025-04-24 RX ADMIN — ACETAMINOPHEN 975 MG: 325 TABLET, FILM COATED ORAL at 05:31

## 2025-04-24 RX ADMIN — CYCLOBENZAPRINE HYDROCHLORIDE 5 MG: 5 TABLET, FILM COATED ORAL at 08:11

## 2025-04-24 RX ADMIN — Medication 1 SPRAY: at 20:43

## 2025-04-24 RX ADMIN — DICLOFENAC SODIUM 2 G: 10 GEL TOPICAL at 06:22

## 2025-04-24 RX ADMIN — MICONAZOLE NITRATE: 20 POWDER TOPICAL at 08:28

## 2025-04-24 RX ADMIN — APIXABAN 5 MG: 5 TABLET, FILM COATED ORAL at 20:42

## 2025-04-24 RX ADMIN — CEFTRIAXONE 2 G: 2 INJECTION, POWDER, FOR SOLUTION INTRAMUSCULAR; INTRAVENOUS at 20:43

## 2025-04-24 ASSESSMENT — ACTIVITIES OF DAILY LIVING (ADL)
ADLS_ACUITY_SCORE: 56
ADLS_ACUITY_SCORE: 54
ADLS_ACUITY_SCORE: 54
DEPENDENT_IADLS:: CLEANING;COOKING;LAUNDRY;SHOPPING;MEAL PREPARATION
ADLS_ACUITY_SCORE: 56
ADLS_ACUITY_SCORE: 62
ADLS_ACUITY_SCORE: 54
ADLS_ACUITY_SCORE: 56
ADLS_ACUITY_SCORE: 62
ADLS_ACUITY_SCORE: 62
ADLS_ACUITY_SCORE: 56
ADLS_ACUITY_SCORE: 54
ADLS_ACUITY_SCORE: 54
ADLS_ACUITY_SCORE: 56
ADLS_ACUITY_SCORE: 56

## 2025-04-24 NOTE — CONSULTS
Care Management Initial Consult    General Information  Assessment completed with: Patient,    Type of CM/SW Visit: Initial Assessment    Primary Care Provider verified and updated as needed:  (No PCP)   Readmission within the last 30 days: no previous admission in last 30 days      Reason for Consult: discharge planning        Communication Assessment  Patient's communication style: spoken language (English or Bilingual)    Hearing Difficulty or Deaf: yes   Wear Glasses or Blind: yes    Cognitive  Cognitive/Neuro/Behavioral: WDL                      Living Environment:   People in home: child(elsie), adult     Current living Arrangements: house      Able to return to prior arrangements:  (Needs TCU before returning home)     Family/Social Support:  Care provided by: self, child(elsie)  Provides care for: no one     Support system: Children          Description of Support System: Supportive, Involved       Current Resources:   Patient receiving home care services: No     Community Resources: Transitional Care  Equipment currently used at home: walker, rolling, raised toilet seat    Does the patient's insurance plan have a 3 day qualifying hospital stay waiver?  Yes     Which insurance plan 3 day waiver is available? Alternative insurance waiver    Will the waiver be used for post-acute placement? No    Lifestyle & Psychosocial Needs:  Social Drivers of Health     Food Insecurity: Low Risk  (4/23/2025)    Food Insecurity     Within the past 12 months, did you worry that your food would run out before you got money to buy more?: No     Within the past 12 months, did the food you bought just not last and you didn t have money to get more?: No   Depression: Not on file   Housing Stability: Low Risk  (4/23/2025)    Housing Stability     Do you have housing? : Yes     Are you worried about losing your housing?: No   Tobacco Use: Low Risk  (4/11/2022)    Patient History     Smoking Tobacco Use: Never     Smokeless Tobacco Use:  Never     Passive Exposure: Not on file   Financial Resource Strain: Low Risk  (4/23/2025)    Financial Resource Strain     Within the past 12 months, have you or your family members you live with been unable to get utilities (heat, electricity) when it was really needed?: No   Alcohol Use: Not on file   Transportation Needs: Low Risk  (4/23/2025)    Transportation Needs     Within the past 12 months, has lack of transportation kept you from medical appointments, getting your medicines, non-medical meetings or appointments, work, or from getting things that you need?: No   Physical Activity: Not on file   Interpersonal Safety: Low Risk  (4/23/2025)    Interpersonal Safety     Do you feel physically and emotionally safe where you currently live?: Yes     Within the past 12 months, have you been hit, slapped, kicked or otherwise physically hurt by someone?: No     Within the past 12 months, have you been humiliated or emotionally abused in other ways by your partner or ex-partner?: No   Stress: Not on file   Social Connections: Not on file   Health Literacy: Not on file       Functional Status:  Prior to admission patient needed assistance:   Dependent ADLs:: Ambulation-walker  Dependent IADLs:: Cleaning, Cooking, Laundry, Shopping, Meal Preparation     Discussed  Partnership in Safe Discharge Planning  document with patient/family: No    Additional Information:  CM/SW consulted for discharge planning. Pt admitted after a fall, weakness, UTI.   Met with pt at bedside. Pt lives in a split-level home with adult son. Pt uses a walker at baseline. Pt has had considerable weakness since having the flu in Feb or March and has been spending most of her time in bed. Son that lives with pt has been assisting with personal cares and IADLs recently.  Therapy is recommending TCU and pt is in agreement. Pt would like referrals sent to [facility] and [facility] in a shared room, referrals sent.    Next Steps: Follow  up on TCU referrals      ADDENDUM: Pt has been offered a TCU bed at [facility] and pt has accepted the offer. Notified provider of accepting facility.    Saundra Guevara RN   Inpatient Care Coordination  [facility]   Phone: [phone]

## 2025-04-24 NOTE — CONSULTS
[facility]  WOC Nurse Inpatient Assessment     Consulted for: Buttock    WOC nurse follow-up plan: weekly    Patient History (according to provider note(s):      Gi Zhao is a 70 year old female with a history of osteoarthritis admitted on 4/23/2025 with a fall and generalized weakness with workup notable for urinary tract infection, rapid atrial fibrillation, and hypothyroidism.     Assessment:      Areas visualized during today's visit: Focused:    Wound location: Bilateral buttocks  Last photo: 4/24/25    Wound due to: Unknown Etiology  Wound history/plan of care: Patient reports she didn't know she had any injury.  Area is not painful.  Possible pressure component as patient fell and was wedged between furniture for an unknown period of time.   Wound base: 100 % Purple     Palpation of the wound bed: normal      Drainage: none     Description of drainage: none     Measurements (length x width x depth, in cm): [id number] area of extensive linear areas of purple discoloration along with other scattered areas of purple discoloration ranging from pinpoint to 1x1cm      Tunneling: N/A     Undermining: N/A  Periwound skin: Intact      Color: normal and consistent with surrounding tissue      Temperature: normal   Odor: none  Pain: denies   Pain interventions prior to dressing change: N/A  Treatment goal: Heal  and Protection  STATUS: initial assessment  Supplies ordered: at bedside     Bilateral legs dry crusted skin and xerosis.      Treatment Plan:     Buttocks wound(s): Every 3 days  Cleanse with wound cleanser and dry  Apply Mepilex sacral     Bilateral legs: BID  Apply Sween cream to moisturize legs and feet     Orders: Written    RECOMMEND PRIMARY TEAM ORDER: None, at this time  Education provided: importance of repositioning, plan of care, and Off-loading pressure  Discussed plan of care with: Patient and Nurse  Notify WOC if wound(s) deteriorate.  Nursing to notify the Provider(s)  and re-consult the [facility] Nurse if new skin concern.    DATA:     Current support surface: Standard  Standard gel mattress (Isoflex)  Containment of urine/stool: Incontinence Protocol  BMI: Body mass index is 40.26 kg/m .   Active diet order: Orders Placed This Encounter      Combination Diet Regular Diet Adult     Output: I/O last 3 completed shifts:  In: 830 [P.O.:830]  Out: 300 [Urine:300]     Labs:   Recent Labs   Lab 04/24/25  0709   HGB 11.4*   WBC 7.9     Pressure injury risk assessment:   Sensory Perception: 4-->no impairment  Moisture: 3-->occasionally moist  Activity: 2-->chairfast  Mobility: 2-->very limited  Nutrition: 3-->adequate  Friction and Shear: 1-->problem  Khalif Score: 15    Ernie Fontenot RN CWOCN  Contact Via Vocera- [facility] Nurse (Susi)  Dept. Office Number: [phone]

## 2025-04-24 NOTE — PLAN OF CARE
Goal Outcome Evaluation:      Plan of Care Reviewed With: patient    Overall Patient Progress: improvingOverall Patient Progress: improving    Outcome Evaluation: Anticipating discharge to TCU.

## 2025-04-24 NOTE — PROVIDER NOTIFICATION
Admitted/transferred from: Home with son  2 RN full skin assessment completed by Michaela Hatch, RN and Amy Severson RN.  Skin assessment finding: issues found See below    Interventions/actions: skin interventions Powder applied to moist folds & barrier cream applied to backside      Will continue to monitor.        04/23/25 2100   Skin   Skin WDL X   Skin Color redness blanchable   Skin Moisture dry;moist  (Moist in folds)   Skin Integrity abrasion/excoriation;bruised (ecchymotic);rash;scab;other (see comments)   Abrasion / Excoriation   (Backside)   Bruised (ecchymotic) location   (Scattered)   Rash   (Under breasts bilaterally, in groin bilaterally, under pannus)   Redness blanchable location Back;Buttocks/IT;Sacrum/Coccyx   Scab   (Scattered)   Other (see comments) Long toe nails   Device Skin Interventions Taken Skin barrier applied;Other (See comment)  (Powder applied to moist folds)

## 2025-04-24 NOTE — ED NOTES
[facility]  ED Nurse Handoff Report    ED Chief complaint: Generalized Weakness  . ED Diagnosis:   Final diagnoses:   Sepsis, due to unspecified organism, unspecified whether acute organ dysfunction present (H)   Atrial fibrillation with rapid ventricular response (H)   Acute cystitis with hematuria   Weakness       Allergies:   Allergies   Allergen Reactions    Oxycontin [Oxycodone] GI Disturbance       Code Status: Full Code    Activity level - Baseline/Home:  walker.  Activity Level - Current:   assist of 2.   Lift room needed: No.   Bariatric: No   Needed: No   Isolation: No.   Infection: Not Applicable.     Respiratory status: Room air    Vital Signs (within 30 minutes):   Vitals:    04/23/25 1841 04/23/25 1842 04/23/25 1843 04/23/25 1844   BP:       Pulse:       Resp:       Temp:       SpO2: 98% 100% 100% 100%   Weight:           Cardiac Rhythm:  ,   Cardiac  Cardiac Rhythm: Normal sinus rhythm  Pain level:    Patient confused: No.   Patient Falls Risk: patient and family education.   Elimination Status: Has voided     Patient Report - Initial Complaint: generalized weakness.   Focused Assessment:   1725  Musculoskeletal  MusculoskeletalMusculoskeletal WDL: .WDL except (pt reporting generalized weakness worse in the bilateral knees. reporting difficulty getting herself around at home.) FH       1724  Cardiac  Cardiac (Adult)Cardiac WDL: .WDL except; rhythm; allPulse Rate & Regularity: tachycardicCardiac Rhythm: Atrial fibrillation  Chest Pain AssessmentChest Pain Intervention: 12-lead ECG obtained; cardiac monitor placed; cardiac monitoring continued        1830  Cardiac  Cardiac (Adult)Cardiac WDL: WDL (pt is in sinus rhythm)Cardiac Rhythm: NSR FH       Abnormal Results:   Labs Ordered and Resulted from Time of ED Arrival to Time of ED Departure   BASIC METABOLIC PANEL - Abnormal       Result Value    Sodium 141      Potassium 4.3      Chloride 107      Carbon Dioxide (CO2)  20 (*)     Anion Gap 14      Urea Nitrogen 16.6      Creatinine 1.17 (*)     GFR Estimate 50 (*)     Calcium 9.3      Glucose 135 (*)    CBC WITH PLATELETS AND DIFFERENTIAL - Abnormal    WBC Count 12.6 (*)     RBC Count 3.97      Hemoglobin 12.4      Hematocrit 37.3      MCV 94      MCH 31.2      MCHC 33.2      RDW 14.6      Platelet Count 172      % Neutrophils 88      % Lymphocytes 5      % Monocytes 6      % Eosinophils 0      % Basophils 0      % Immature Granulocytes 0      NRBCs per 100 WBC 0      Absolute Neutrophils 11.2 (*)     Absolute Lymphocytes 0.7 (*)     Absolute Monocytes 0.7      Absolute Eosinophils 0.0      Absolute Basophils 0.0      Absolute Immature Granulocytes 0.1      Absolute NRBCs 0.0     LACTIC ACID WHOLE BLOOD WITH 1X REPEAT IN 2 HR WHEN >2 - Abnormal    Lactic Acid, Initial 2.8 (*)    ROUTINE UA WITH MICROSCOPIC REFLEX TO CULTURE - Abnormal    Color Urine Light Yellow      Appearance Urine Clear      Glucose Urine Negative      Bilirubin Urine Negative      Ketones Urine Trace (*)     Specific Gravity Urine 1.010      Blood Urine Small (*)     pH Urine 6.5      Protein Albumin Urine 20 (*)     Urobilinogen Urine Normal      Nitrite Urine Negative      Leukocyte Esterase Urine Large (*)     Bacteria Urine Many (*)     Mucus Urine Present (*)     RBC Urine 14 (*)     WBC Urine 33 (*)     Squamous Epithelials Urine 1      Hyaline Casts Urine 1     TSH WITH FREE T4 REFLEX - Abnormal    TSH 24.51 (*)    T4 FREE - Abnormal    Free T4 0.87 (*)    MAGNESIUM - Normal    Magnesium 1.9     LACTIC ACID WHOLE BLOOD - Normal    Lactic Acid 1.7     BLOOD CULTURE   BLOOD CULTURE   URINE CULTURE        XR Chest 2 Views   Final Result   IMPRESSION: Technically limited exam. Heart size is likely enlarged. Prominence of central pulmonary vasculature and increased interstitial density suspicious for early edema. No lobar consolidation, effusions, or pneumothorax. Advanced degenerative    changes in both  shoulders, left greater than right.      CT Cervical Spine w/o Contrast   Preliminary Result   IMPRESSION:   HEAD CT:   1.  Superficial soft tissue contusion/subgaleal hematoma is seen along the posterior superior parietal scalp.   2.  No finding for intracranial hemorrhage, mass, or acute infarct.   3.  Mild volume loss and presumed sequela chronic microvascular ischemic change.      CERVICAL SPINE CT:   1.  Moderate to advanced cervical spondylosis without finding for acute fracture.   2.  Grade I anterolisthesis C3 on C4, C4 on C5 and C7 on T1. Listhesis at these levels is age-indeterminate but favored to be chronic and degenerative in etiology.         CT Head w/o Contrast   Preliminary Result   IMPRESSION:   HEAD CT:   1.  Superficial soft tissue contusion/subgaleal hematoma is seen along the posterior superior parietal scalp.   2.  No finding for intracranial hemorrhage, mass, or acute infarct.   3.  Mild volume loss and presumed sequela chronic microvascular ischemic change.      CERVICAL SPINE CT:   1.  Moderate to advanced cervical spondylosis without finding for acute fracture.   2.  Grade I anterolisthesis C3 on C4, C4 on C5 and C7 on T1. Listhesis at these levels is age-indeterminate but favored to be chronic and degenerative in etiology.             Treatments provided: See MAR  Family Comments: N/A  OBS brochure/video discussed/provided to patient:  No  ED Medications:   Medications   cefTRIAXone (ROCEPHIN) 2 g vial to attach to  ml bag for ADULTS or NS 50 ml bag for PEDS (has no administration in time range)   sodium chloride 0.9% BOLUS 2,000 mL (0 mLs Intravenous Stopped 4/23/25 1809)       Drips infusing:  No  For the majority of the shift this patient was Green.   Interventions performed were N/A.    Sepsis treatment initiated: No    Cares/treatment/interventions/medications to be completed following ED care: N/A    ED Nurse Name: Renzo Ramírez, RN  7:50 PM    RECEIVING UNIT ED HANDOFF  REVIEW    Above ED Nurse Handoff Report was reviewed: Yes  Reviewed by: Michaela Hatch RN on April 23, 2025 at 8:08 PM   I Manjula called the ED to inform them the note was read: Yes

## 2025-04-24 NOTE — H&P
"[facility] History and Physical    Gi Zhao MRN# [id number]   Age: 70 year old YOB: 1955     Date of Admission:  4/23/2025    Home clinic: No Ref-Primary, Physician          Assessment and Plan:   Assessment:   Gi Zhao is a 70 year old woman who was brought to attention today after her son found her next to her bed with her head wedged between the bed and a piece of furniture. She was unable to get up and apparently has been weak since having \"the flu\" in March. She feels that she has been weak     PMH is reportedly negative, but she has a number of orthopedic procedures.     On presentation to the ED, VS: /83, HR 91, RR 18 O2 sats 98% in RA.  Afeb. She was noted to be in A fib with RVR in the ED.   Exam: alert and coherent. IRRIR and tachy (briefly) but later was in NSR.  Breathing normal without wheeze or rales.   Labs: Creatinine 1.17, CO2 20, other electrolytes normal.  Glucose 135, lactic acid 2.8.  Free T40.87 Which is just below the threshold of normal and TSH 24.5.  WBC 12.6, Hgb 12.4, PLT 1.72.  Urinalysis shows small blood, large LE, 38 WBCs and 14 RBCs per high-powered field.  1 squamous epithelial cell per high-powered field noted.  Blood and urine cultures were obtained.  Imaging: CT head and neck are negative for fracture.  Chest x-ray is interpreted as possible cardiomegaly with prominent pulmonary vasculature.    Interventions in the ED: 2 L NS bolus and Ceftriaxone 2 g IV x 1.    DX:  Generalized weakness.   A fib with RVR.  UTI is suspected (as this was a straight cath specimen).    Sepsis is likely (criteria: tachycardia - is equivocal because it was A fib, elevated WBC, lactate 2.8).   Possible JODIE.    Potentially new diagnosis of Overt Hypothyroidism (high TSH and low fT4).        Plan:   Admit to inpatient on telemetry.  Consider Zio on discharge for eval of A fib.   Ceftriaxone 1 g IV q 24.   PT/OT and SW consultations. The pt is highly " "likely to need dispo to TCU as she has been in bed (if not truly \"bed-bound\" for the past month or so.   Will empirically start Thyroid replacement therapy at 25 mcg daily.              Chief Complaint:     Weakness, fell while getting out of bed.     History is obtained from the patient, electronic health record, and emergency department physician     Ms. Zhao indicates that she lives with one of her sons, Brian, who has Autism but is independent and able to work outside the home. She has another son who lives in the [facility] and is also involved in her life.    Today, she was trying to get out of bed and is not entierly certain how she fell and get her head wedged between the bed and a large vanity.  She was too weak to get herself out, so when her son came home, she called to him and he was able to help her. But because he could not get her up on his own, they called 911. She was unable to ambulate with a walker, so she was brought to the ED for further evaluation.          Past Medical History:     Past Medical History:   Diagnosis Date    Pain in both knees              Past Surgical History:      Past Surgical History:   Procedure Laterality Date    ARTHROPLASTY KNEE BILATERAL Bilateral 4/6/2015    Procedure: ARTHROPLASTY KNEE BILATERAL;  Surgeon: Tate Milan MD;  Location: [facility] OR    CLOSED REDUCTION LOWER EXTREMITY Right 6/19/2015    Procedure: CLOSED REDUCTION LOWER EXTREMITY;  Surgeon: Wilton Gonzalez MD;  Location: [facility] OR    MANDIBLE SURGERY      REPAIR RETINACULAR OPEN MEDIAL OR LATERAL Bilateral 4/21/2015    Procedure: REPAIR RETINACULAR OPEN MEDIAL OR LATERAL;  Surgeon: Tate Milan MD;  Location: [facility] OR    REPAIR TENDON QUADRICEPS Right 6/24/2015    Procedure: REPAIR TENDON QUADRICEPS;  Surgeon: Tate Milan MD;  Location: [facility] OR    REPAIR TENDON QUADRICEPS Right 11/9/2015    Procedure: REPAIR TENDON QUADRICEPS;  Surgeon: Tate Milan MD; "  Location: [facility] OR             Social History:     Social History     Tobacco Use    Smoking status: Never    Smokeless tobacco: Never   Substance Use Topics    Alcohol use: No            Immunizations:     Immunization History   Administered Date(s) Administered    Influenza Vaccine >6 months,quad, PF 10/23/2015, 11/29/2016    Pneumococcal 23 valent 04/14/2015    TDAP Vaccine (Adacel) 03/17/2015             Allergies:     Allergies   Allergen Reactions    Oxycontin [Oxycodone] GI Disturbance             Medications:     Medications Prior to Admission   Medication Sig Dispense Refill Last Dose/Taking    acetaminophen (TYLENOL 8 HOUR ARTHRITIS PAIN) 650 MG CR tablet Take 1,300 mg by mouth every 8 hours as needed for mild pain or fever.   4/23/2025 Morning             Review of Systems:     A comprehensive review of systems was performed and found to be negative except as described in this note           Physical Exam:   Vitals were reviewed  Temp: 97.8  F (36.6  C) Temp src: Oral BP: (!) 144/76 Pulse: 77   Resp: 20 SpO2: 100 % O2 Device: None (Room air)    Constitutional: Awake, alert, cooperative, no apparent distress, and appears stated age. Pleasant, hard of hearing. Smells strongly of urine.   Eyes: Lids and lashes normal, pupils equal, round and reactive to light, extra ocular muscles intact, sclera clear, conjunctiva normal.  ENT: Normocephalic, without obvious abnormality, atraumatic.  No facial muscular asymmetry.  Neck: Supple, symmetrical, trachea midline, no adenopathy, thyroid symmetric, not enlarged and no tenderness, skin normal.  Hematologic / Lymphatic: No cervical lymphadenopathy and no supraclavicular lymphadenopathy.  Back: Symmetric, no curvature, spinous processes are non-tender on palpation, paraspinous muscles are non-tender on palpation, no costal vertebral tenderness.  Lungs: No increased work of breathing, good air exchange, clear to auscultation bilaterally, no crackles or  wheezing.  Cardiovascular: Regular rate and rhythm, normal S1 and S2, no S3 or S4, and no murmur noted. (A fib with RVR had converted by this point)  Abdomen: Normal bowel sounds, soft, non-distended, non-tender, no masses palpated, no hepatosplenomegaly. Obese.  Musculoskeletal: No redness, warmth, or swelling of the joints.  Tone is normal. No pitting edema over the ankles.  Complains of chronic pain involving the right knee.  Neurologic: Awake, alert, oriented to name, place and time.  Cranial nerves II-XII are grossly intact.  Motor is 5 out of 5 bilaterally.    Neuropsychiatric: Normal affect, mood, orientation, memory and insight.  Skin: No rashes, erythema, pallor, petechia or purpura. The toe nails on the right foot are extremely long and it seems she has not washed her feet in a long time.          Data:     Results for orders placed or performed during the hospital encounter of 04/23/25 (from the past 24 hours)   CBC with differential    Narrative    The following orders were created for panel order CBC with differential.  Procedure                               Abnormality         Status                     ---------                               -----------         ------                     CBC with platelets and ...[[id number]]  Abnormal            Final result                 Please view results for these tests on the individual orders.   Basic metabolic panel (BMP)   Result Value Ref Range    Sodium 141 135 - 145 mmol/L    Potassium 4.3 3.4 - 5.3 mmol/L    Chloride 107 98 - 107 mmol/L    Carbon Dioxide (CO2) 20 (L) 22 - 29 mmol/L    Anion Gap 14 7 - 15 mmol/L    Urea Nitrogen 16.6 8.0 - 23.0 mg/dL    Creatinine 1.17 (H) 0.51 - 0.95 mg/dL    GFR Estimate 50 (L) >60 mL/min/1.73m2    Calcium 9.3 8.8 - 10.4 mg/dL    Glucose 135 (H) 70 - 99 mg/dL   Extra Tube ([place] Draw)    Narrative    The following orders were created for panel order Extra Tube ([place] Draw).  Procedure                                Abnormality         Status                     ---------                               -----------         ------                     Extra Blue Top Tube[[id number]]                             Final result               Extra Green Top (Lithiu...[[id number]]                      Final result                 Please view results for these tests on the individual orders.   CBC with platelets and differential   Result Value Ref Range    WBC Count 12.6 (H) 4.0 - 11.0 10e3/uL    RBC Count 3.97 3.80 - 5.20 10e6/uL    Hemoglobin 12.4 11.7 - 15.7 g/dL    Hematocrit 37.3 35.0 - 47.0 %    MCV 94 78 - 100 fL    MCH 31.2 26.5 - 33.0 pg    MCHC 33.2 31.5 - 36.5 g/dL    RDW 14.6 10.0 - 15.0 %    Platelet Count 172 150 - 450 10e3/uL    % Neutrophils 88 %    % Lymphocytes 5 %    % Monocytes 6 %    % Eosinophils 0 %    % Basophils 0 %    % Immature Granulocytes 0 %    NRBCs per 100 WBC 0 <1 /100    Absolute Neutrophils 11.2 (H) 1.6 - 8.3 10e3/uL    Absolute Lymphocytes 0.7 (L) 0.8 - 5.3 10e3/uL    Absolute Monocytes 0.7 0.0 - 1.3 10e3/uL    Absolute Eosinophils 0.0 0.0 - 0.7 10e3/uL    Absolute Basophils 0.0 0.0 - 0.2 10e3/uL    Absolute Immature Granulocytes 0.1 <=0.4 10e3/uL    Absolute NRBCs 0.0 10e3/uL   Extra Blue Top Tube   Result Value Ref Range    Hold Specimen JIC    Extra Green Top (Lithium Heparin) ON ICE   Result Value Ref Range    Hold Specimen JI    Lactic acid whole blood with 1x repeat in 2 hr when >2   Result Value Ref Range    Lactic Acid, Initial 2.8 (H) 0.7 - 2.0 mmol/L   Magnesium   Result Value Ref Range    Magnesium 1.9 1.7 - 2.3 mg/dL   TSH with free T4 reflex   Result Value Ref Range    TSH 24.51 (H) 0.30 - 4.20 uIU/mL   T4 free   Result Value Ref Range    Free T4 0.87 (L) 0.90 - 1.70 ng/dL   Phosphorus   Result Value Ref Range    Phosphorus 2.9 2.5 - 4.5 mg/dL   EKG 12-lead, tracing only   Result Value Ref Range    Systolic Blood Pressure  mmHg    Diastolic Blood Pressure  mmHg    Ventricular Rate 131 BPM     Atrial Rate 267 BPM    NE Interval  ms    QRS Duration 84 ms     ms    QTc 428 ms    P Axis  degrees    R AXIS 43 degrees    T Axis -18 degrees    Interpretation ECG       Atrial fibrillation with rapid ventricular response with premature ventricular or aberrantly conducted complexes  Nonspecific T wave abnormality  Abnormal ECG  When compared with ECG of 06-Apr-2015 06:22,  Atrial fibrillation has replaced Sinus rhythm  ST no longer elevated in Anterolateral leads  T wave inversion more evident in Inferior leads  Nonspecific T wave abnormality now evident in Lateral leads     UA with Microscopic reflex to Culture    Specimen: Urine, Catheter   Result Value Ref Range    Color Urine Light Yellow Colorless, Straw, Light Yellow, Yellow    Appearance Urine Clear Clear    Glucose Urine Negative Negative mg/dL    Bilirubin Urine Negative Negative    Ketones Urine Trace (A) Negative mg/dL    Specific Gravity Urine 1.010 1.003 - 1.035    Blood Urine Small (A) Negative    pH Urine 6.5 5.0 - 7.0    Protein Albumin Urine 20 (A) Negative mg/dL    Urobilinogen Urine Normal Normal mg/dL    Nitrite Urine Negative Negative    Leukocyte Esterase Urine Large (A) Negative    Bacteria Urine Many (A) None Seen /HPF    Mucus Urine Present (A) None Seen /LPF    RBC Urine 14 (H) <=2 /HPF    WBC Urine 33 (H) <=5 /HPF    Squamous Epithelials Urine 1 <=1 /HPF    Hyaline Casts Urine 1 <=2 /LPF    Narrative    Urine Culture ordered based on laboratory criteria   Lactic acid whole blood   Result Value Ref Range    Lactic Acid 1.7 0.7 - 2.0 mmol/L   CT Head w/o Contrast    Narrative    EXAM: CT HEAD W/O CONTRAST, CT CERVICAL SPINE W/O CONTRAST  LOCATION: [facility]  DATE: 4/23/2025    INDICATION: Fall. Increasing weakness.  COMPARISON: CT brain 10/24/2020.  TECHNIQUE:   1) Routine CT Head without IV contrast. Multiplanar reformats. Dose reduction techniques were used.  2) Routine CT Cervical Spine without IV  contrast. Multiplanar reformats. Dose reduction techniques were used.    FINDINGS:   HEAD CT:   INTRACRANIAL CONTENTS: No finding for intracranial hemorrhage, mass, or acute infarct. Mild prominence of the lateral ventricles. Small amount of low-attenuation change is again seen within the periventricular white matter compatible with nondescript   gliosis. No transcortical areas of low attenuation change. No mass effect or midline shift.    Cerebellar tonsils are normally positioned. Sella is unremarkable for technique. Corpus callosum is normally formed.    VISUALIZED ORBITS/SINUSES/MASTOIDS: No intraorbital abnormality. No paranasal sinus mucosal disease. No middle ear or mastoid effusion.    BONES/SOFT TISSUES: Calvarium is intact, without fracture or suspicious lytic or blastic foci. Moderate-sized superficial soft tissue contusion/subgaleal hematoma seen along the posterior superior parietal scalp.    CERVICAL SPINE CT:   VERTEBRA: Lateral alignment is essentially normal. There is reversal of the normal cervical lordosis with grade I anterolisthesis of C3 on C4 and C4 on C5 and subtle anterolisthesis of C7 on T1. Listhesis at these levels is age-indeterminate but favored   to be chronic and degenerative in etiology given congruent appearance of associated facet degenerative change at these levels and lack of associated prevertebral soft tissue swelling.    Craniocervical junction is intact. Mild degenerative change anterior C1-C2 articulation. There is severe interspace narrowing at C5-C6 and C6-C7 with mild associated endplate irregularity and extensive endplate sclerosis and prominent ventral spurring at   these levels. Mild prominent ventral spurring C4-C5 with mild interspace narrowing. Mild interspace narrowing C3-C4 and C7-T1.    Moderate or advanced facet arthropathy is seen on the left at the C2-C3 and C3-C4 levels with moderate left sided facet arthropathy at C7-T1 and T1-T2. Moderate right-sided facet  arthropathy C4-C5, C7-T1, and T1-T2.     CANAL/FORAMINA: Posterior interbody ridging contributes to mild spinal canal stenosis at C5-C6. Moderate right foraminal stenosis C4-C5. Severe right and moderate left foraminal stenosis C5-C6, and moderate left and mild right foraminal stenosis C6-C7.    PARASPINAL: Dorsal paraspinal soft tissues are unremarkable. Visualized lung apices are clear.       Impression    IMPRESSION:  HEAD CT:  1.  Superficial soft tissue contusion/subgaleal hematoma is seen along the posterior superior parietal scalp.  2.  No finding for intracranial hemorrhage, mass, or acute infarct.  3.  Mild volume loss and presumed sequela chronic microvascular ischemic change.    CERVICAL SPINE CT:  1.  Moderate to advanced cervical spondylosis without finding for acute fracture.  2.  Grade I anterolisthesis C3 on C4, C4 on C5 and C7 on T1. Listhesis at these levels is age-indeterminate but favored to be chronic and degenerative in etiology. Correlate for cervical pain.     CT Cervical Spine w/o Contrast    Narrative    EXAM: CT HEAD W/O CONTRAST, CT CERVICAL SPINE W/O CONTRAST  LOCATION: [facility]  DATE: 4/23/2025    INDICATION: Fall. Increasing weakness.  COMPARISON: CT brain 10/24/2020.  TECHNIQUE:   1) Routine CT Head without IV contrast. Multiplanar reformats. Dose reduction techniques were used.  2) Routine CT Cervical Spine without IV contrast. Multiplanar reformats. Dose reduction techniques were used.    FINDINGS:   HEAD CT:   INTRACRANIAL CONTENTS: No finding for intracranial hemorrhage, mass, or acute infarct. Mild prominence of the lateral ventricles. Small amount of low-attenuation change is again seen within the periventricular white matter compatible with nondescript   gliosis. No transcortical areas of low attenuation change. No mass effect or midline shift.    Cerebellar tonsils are normally positioned. Sella is unremarkable for technique. Corpus callosum is  normally formed.    VISUALIZED ORBITS/SINUSES/MASTOIDS: No intraorbital abnormality. No paranasal sinus mucosal disease. No middle ear or mastoid effusion.    BONES/SOFT TISSUES: Calvarium is intact, without fracture or suspicious lytic or blastic foci. Moderate-sized superficial soft tissue contusion/subgaleal hematoma seen along the posterior superior parietal scalp.    CERVICAL SPINE CT:   VERTEBRA: Lateral alignment is essentially normal. There is reversal of the normal cervical lordosis with grade I anterolisthesis of C3 on C4 and C4 on C5 and subtle anterolisthesis of C7 on T1. Listhesis at these levels is age-indeterminate but favored   to be chronic and degenerative in etiology given congruent appearance of associated facet degenerative change at these levels and lack of associated prevertebral soft tissue swelling.    Craniocervical junction is intact. Mild degenerative change anterior C1-C2 articulation. There is severe interspace narrowing at C5-C6 and C6-C7 with mild associated endplate irregularity and extensive endplate sclerosis and prominent ventral spurring at   these levels. Mild prominent ventral spurring C4-C5 with mild interspace narrowing. Mild interspace narrowing C3-C4 and C7-T1.    Moderate or advanced facet arthropathy is seen on the left at the C2-C3 and C3-C4 levels with moderate left sided facet arthropathy at C7-T1 and T1-T2. Moderate right-sided facet arthropathy C4-C5, C7-T1, and T1-T2.     CANAL/FORAMINA: Posterior interbody ridging contributes to mild spinal canal stenosis at C5-C6. Moderate right foraminal stenosis C4-C5. Severe right and moderate left foraminal stenosis C5-C6, and moderate left and mild right foraminal stenosis C6-C7.    PARASPINAL: Dorsal paraspinal soft tissues are unremarkable. Visualized lung apices are clear.       Impression    IMPRESSION:  HEAD CT:  1.  Superficial soft tissue contusion/subgaleal hematoma is seen along the posterior superior parietal  scalp.  2.  No finding for intracranial hemorrhage, mass, or acute infarct.  3.  Mild volume loss and presumed sequela chronic microvascular ischemic change.    CERVICAL SPINE CT:  1.  Moderate to advanced cervical spondylosis without finding for acute fracture.  2.  Grade I anterolisthesis C3 on C4, C4 on C5 and C7 on T1. Listhesis at these levels is age-indeterminate but favored to be chronic and degenerative in etiology. Correlate for cervical pain.     XR Chest 2 Views    Narrative    EXAM: XR CHEST 2 VIEWS  LOCATION: [facility]  DATE: 4/23/2025    INDICATION: fever  COMPARISON: None.      Impression    IMPRESSION: Technically limited exam. Heart size is likely enlarged. Prominence of central pulmonary vasculature and increased interstitial density suspicious for early edema. No lobar consolidation, effusions, or pneumothorax. Advanced degenerative   changes in both shoulders, left greater than right.      All cardiac studies reviewed by me.   All imaging studies reviewed by me.      Attestation:  I have reviewed today's vital signs, notes, medications, labs and imaging.     Raul Shepard MD

## 2025-04-24 NOTE — PLAN OF CARE
"Goal Outcome Evaluation:      Plan of Care Reviewed With: patient    Overall Patient Progress: improvingOverall Patient Progress: improving    Outcome Evaluation: A&O x4, generalized weakness noted. Redness under skin folds, cleaned, anti-fungal poweder applied. Turning and repositioning every 2-3 hours.  Complains of R-knee\leg soreness, prn Voltaren cream applied to painful areas per order. Up with heavy assist of 2 and Savita steady.     Wound care done per order.     ECHO done. SW\PT following.     Problem: Adult Inpatient Plan of Care  Goal: Plan of Care Review  Description: The Plan of Care Review/Shift note should be completed every shift.  The Outcome Evaluation is a brief statement about your assessment that the patient is improving, declining, or no change.  This information will be displayed automatically on your shiftnote.  Outcome: Progressing  Flowsheets (Taken 4/24/2025 1108)  Outcome Evaluation: A&O x4, generalized weakness noted. Redness under skin folds, cleaned, anti-fungal poweder applied. Turning and repositioning every 2-3 hours.  Plan of Care Reviewed With: patient  Overall Patient Progress: improving  Goal: Patient-Specific Goal (Individualized)  Description: You can add care plan individualizations to a care plan. Examples of Individualization might be:  \"Parent requests to be called daily at 9am for status\", \"I have a hard time hearing out of my right ear\", or \"Do not touch me to wake me up as it startlesme\".  Outcome: Progressing  Goal: Absence of Hospital-Acquired Illness or Injury  Outcome: Progressing  Intervention: Identify and Manage Fall Risk  Recent Flowsheet Documentation  Taken 4/24/2025 1000 by Jenny Monsivais RN  Safety Promotion/Fall Prevention: safety round/check completed  Taken 4/24/2025 0808 by Jenny Monsivais RN  Safety Promotion/Fall Prevention: safety round/check completed  Intervention: Prevent Skin Injury  Recent Flowsheet Documentation  Taken 4/24/2025 1000 by Jenny Monsivais " A, RN  Body Position:   turned   right   lower extremity elevated   heels elevated  Skin Protection:   adhesive use limited   incontinence pads utilized  Intervention: Prevent Infection  Recent Flowsheet Documentation  Taken 4/24/2025 0808 by Jenny Monsivais RN  Infection Prevention: hand hygiene promoted  Goal: Optimal Comfort and Wellbeing  Outcome: Progressing  Intervention: Monitor Pain and Promote Comfort  Recent Flowsheet Documentation  Taken 4/24/2025 0808 by Jenny Monsivais RN  Pain Management Interventions: medication (see MAR)  Goal: Readiness for Transition of Care  Outcome: Progressing     Problem: Delirium  Goal: Optimal Coping  Outcome: Progressing  Goal: Improved Behavioral Control  Outcome: Progressing  Intervention: Minimize Safety Risk  Recent Flowsheet Documentation  Taken 4/24/2025 0808 by Jenny Monsivais RN  Enhanced Safety Measures:   review medications for side effects with activity   patient/family teach back on injury risk  Goal: Improved Attention and Thought Clarity  Outcome: Progressing  Goal: Improved Sleep  Outcome: Progressing     Problem: Skin Injury Risk Increased  Goal: Skin Health and Integrity  Outcome: Progressing  Intervention: Plan: Nurse Driven Intervention: Moisture Management  Recent Flowsheet Documentation  Taken 4/24/2025 1000 by Jenny Monsivais RN  Bathing/Skin Care: incontinence care  Taken 4/24/2025 0808 by Jenny Monsivais RN  Moisture Interventions:   Encourage regular toileting   No brief in bed   Incontinence pad   Perineal cleanser   Barrier ointment (CriticAid, Triad paste)  Intervention: Plan: Nurse Driven Intervention: Friction and Shear  Recent Flowsheet Documentation  Taken 4/24/2025 0808 by Jenny Monsivais RN  Friction/Shear Interventions: HOB 30 degrees or less  Intervention: Optimize Skin Protection  Recent Flowsheet Documentation  Taken 4/24/2025 1000 by Jenny Monsivais RN  Pressure Reduction Techniques:   heels elevated off bed   weight shift assistance  provided   frequent weight shift encouraged  Skin Protection:   adhesive use limited   incontinence pads utilized  Head of Bed (HOB) Positioning: HOB at 20 degrees  Taken 4/24/2025 0808 by Jenny Monsivais RN  Activity Management: activity adjusted per tolerance     Problem: Dysrhythmia  Goal: Normalized Cardiac Rhythm  Outcome: Progressing     Problem: Fall Injury Risk  Goal: Absence of Fall and Fall-Related Injury  Outcome: Progressing  Intervention: Identify and Manage Contributors  Recent Flowsheet Documentation  Taken 4/24/2025 0808 by Jenny Monsivais RN  Medication Review/Management: medications reviewed  Intervention: Promote Injury-Free Environment  Recent Flowsheet Documentation  Taken 4/24/2025 1000 by Jenny Monsivais RN  Safety Promotion/Fall Prevention: safety round/check completed  Taken 4/24/2025 0808 by Jenny Monsivais RN  Safety Promotion/Fall Prevention: safety round/check completed     Problem: Fatigue  Goal: Improved Activity Tolerance  Outcome: Progressing  Intervention: Promote Improved Energy  Recent Flowsheet Documentation  Taken 4/24/2025 0808 by Jenny Monsivais RN  Activity Management: activity adjusted per tolerance

## 2025-04-24 NOTE — PROGRESS NOTES
[facility]    Hospitalist Progress Note  Name: Gi Zhao    MRN: [id number]  Provider:  Will Alves DO, MPH  Date of Service: 04/24/2025    Summary of Stay: Gi Zhao is a 70 year old female with a history of osteoarthritis admitted on 4/23/2025 with a fall and generalized weakness with workup notable for urinary tract infection, rapid atrial fibrillation, and hypothyroidism.    Problem List:   Mechanical fall and generalized weakness: Her UTI may be contributing.  Will treat her UTI and manage her atrial fibrillation and hypothyroidism.  Will request PT consultation.  She lives with her son who helps her with her ADLs.   Sepsis secondary to urinary tract infection: Technically meets criteria for sepsis with tachycardia, leukocytosis, and lactic acidosis.  Urine culture is pending but we will continue treatment with ceftriaxone.  Rapid atrial fibrillation: Heart rate is now controlled and back into a normal sinus rhythm.  Possibly driven by her hypothyroidism and UTI.  Will hold off on AV norman blocking agents for now.  [id number] is elevated and she meets criteria for full anticoagulation so we will initiate Eliquis.  Risks of bleeding were discussed with the patient and her son who agree with initiating anticoagulation.  Will check an echocardiogram and monitor on telemetry.  Consider Zio patch on discharge.  Overt hypothyroidism: Patient is a high TSH and a low free T4.  Started on levothyroxine 25 mcg daily.    DVT Prophylaxis: DOAC  Code Status: Full Code  Diet: Combination Diet Regular Diet Adult    Ryan Catheter: Not present  Disposition: Expected discharge in 1-2 days to home. Goals prior to discharge include PT.   Incidental Findings: None.  Family updated today: Yes son Bahman by phone.    40 MINUTES SPENT BY ME on the date of service doing chart review, history, exam, documentation & further activities per the note.      Interval History   Assumed care from previous  hospitalist. The history was fully reviewed.  The patient reports doing well. No chest pain or shortness of breath. No nausea, vomiting, diarrhea, constipation. No fevers. No other specific complaints identified.     -Data reviewed today: I personally reviewed all new labs and imaging results over the last 24 hours.     Physical Exam   Temp: 99.1  F (37.3  C) Temp src: Oral BP: (!) 152/71 Pulse: 88   Resp: 20 SpO2: 95 % O2 Device: None (Room air)    Vitals:    04/23/25 1540 04/23/25 2100   Weight: 119.2 kg (262 lb 12.6 oz) 120.1 kg (264 lb 12.4 oz)     Vital Signs with Ranges  Temp:  [97.8  F (36.6  C)-99.1  F (37.3  C)] 99.1  F (37.3  C)  Pulse:  [] 88  Resp:  [18-20] 20  BP: (104-158)/(53-95) 152/71  SpO2:  [95 %-100 %] 95 %  I/O last 3 completed shifts:  In: 350 [P.O.:350]  Out: 300 [Urine:300]    GENERAL: No apparent distress. Awake, alert, and fully oriented.  HEENT: Normocephalic, atraumatic. Extraocular movements intact.  CARDIOVASCULAR: Regular rate and rhythm without murmurs or rubs. No S3.  PULMONARY: Clear bilaterally.  GASTROINTESTINAL: Soft, non-tender, non-distended. Bowel sounds normoactive.   EXTREMITIES: No cyanosis or clubbing. No edema.  NEUROLOGICAL: CN 2-12 grossly intact, no focal neurological deficits.  DERMATOLOGICAL: No rash, ulcer, bruising, nor jaundice.     Medications   Current Facility-Administered Medications   Medication Dose Route Frequency Provider Last Rate Last Admin     Current Facility-Administered Medications   Medication Dose Route Frequency Provider Last Rate Last Admin    artificial saliva (BIOTENE MT) solution 1 spray  1 spray Mouth/Throat 4x Daily Raul Shepard MD   1 spray at 04/24/25 0812    cefTRIAXone (ROCEPHIN) 2 g vial to attach to  ml bag for ADULTS or NS 50 ml bag for PEDS  2 g Intravenous Q24H Raul Shepard MD        levothyroxine (SYNTHROID/LEVOTHROID) tablet 25 mcg  25 mcg Oral QAM AC Raul Shepard MD   25 mcg at 04/24/25 0811    sodium  "chloride (PF) 0.9% PF flush 3 mL  3 mL Intracatheter Q8H Raul Silverman MD   3 mL at 04/24/25 0531     Data     Laboratory:  Recent Labs   Lab 04/24/25  0709 04/23/25  1553   WBC 7.9 12.6*   HGB 11.4* 12.4   HCT 34.6* 37.3   MCV 93 94    172     Recent Labs   Lab 04/24/25  0709 04/23/25  1553    141   POTASSIUM 4.0 4.3   CHLORIDE 108* 107   CO2 20* 20*   ANIONGAP 13 14   GLC 99 135*   BUN 16.3 16.6   CR 1.02* 1.17*   GFRESTIMATED 59* 50*   MARTHA 9.2 9.3     No results for input(s): \"CULT\" in the last 168 hours.    Imaging:  Recent Results (from the past 24 hours)   CT Head w/o Contrast    Narrative    EXAM: CT HEAD W/O CONTRAST, CT CERVICAL SPINE W/O CONTRAST  LOCATION: [facility]  DATE: 4/23/2025    INDICATION: Fall. Increasing weakness.  COMPARISON: CT brain 10/24/2020.  TECHNIQUE:   1) Routine CT Head without IV contrast. Multiplanar reformats. Dose reduction techniques were used.  2) Routine CT Cervical Spine without IV contrast. Multiplanar reformats. Dose reduction techniques were used.    FINDINGS:   HEAD CT:   INTRACRANIAL CONTENTS: No finding for intracranial hemorrhage, mass, or acute infarct. Mild prominence of the lateral ventricles. Small amount of low-attenuation change is again seen within the periventricular white matter compatible with nondescript   gliosis. No transcortical areas of low attenuation change. No mass effect or midline shift.    Cerebellar tonsils are normally positioned. Sella is unremarkable for technique. Corpus callosum is normally formed.    VISUALIZED ORBITS/SINUSES/MASTOIDS: No intraorbital abnormality. No paranasal sinus mucosal disease. No middle ear or mastoid effusion.    BONES/SOFT TISSUES: Calvarium is intact, without fracture or suspicious lytic or blastic foci. Moderate-sized superficial soft tissue contusion/subgaleal hematoma seen along the posterior superior parietal scalp.    CERVICAL SPINE CT:   VERTEBRA: Lateral alignment is " essentially normal. There is reversal of the normal cervical lordosis with grade I anterolisthesis of C3 on C4 and C4 on C5 and subtle anterolisthesis of C7 on T1. Listhesis at these levels is age-indeterminate but favored   to be chronic and degenerative in etiology given congruent appearance of associated facet degenerative change at these levels and lack of associated prevertebral soft tissue swelling.    Craniocervical junction is intact. Mild degenerative change anterior C1-C2 articulation. There is severe interspace narrowing at C5-C6 and C6-C7 with mild associated endplate irregularity and extensive endplate sclerosis and prominent ventral spurring at   these levels. Mild prominent ventral spurring C4-C5 with mild interspace narrowing. Mild interspace narrowing C3-C4 and C7-T1.    Moderate or advanced facet arthropathy is seen on the left at the C2-C3 and C3-C4 levels with moderate left sided facet arthropathy at C7-T1 and T1-T2. Moderate right-sided facet arthropathy C4-C5, C7-T1, and T1-T2.     CANAL/FORAMINA: Posterior interbody ridging contributes to mild spinal canal stenosis at C5-C6. Moderate right foraminal stenosis C4-C5. Severe right and moderate left foraminal stenosis C5-C6, and moderate left and mild right foraminal stenosis C6-C7.    PARASPINAL: Dorsal paraspinal soft tissues are unremarkable. Visualized lung apices are clear.       Impression    IMPRESSION:  HEAD CT:  1.  Superficial soft tissue contusion/subgaleal hematoma is seen along the posterior superior parietal scalp.  2.  No finding for intracranial hemorrhage, mass, or acute infarct.  3.  Mild volume loss and presumed sequela chronic microvascular ischemic change.    CERVICAL SPINE CT:  1.  Moderate to advanced cervical spondylosis without finding for acute fracture.  2.  Grade I anterolisthesis C3 on C4, C4 on C5 and C7 on T1. Listhesis at these levels is age-indeterminate but favored to be chronic and degenerative in etiology.  Correlate for cervical pain.     CT Cervical Spine w/o Contrast    Narrative    EXAM: CT HEAD W/O CONTRAST, CT CERVICAL SPINE W/O CONTRAST  LOCATION: [facility]  DATE: 4/23/2025    INDICATION: Fall. Increasing weakness.  COMPARISON: CT brain 10/24/2020.  TECHNIQUE:   1) Routine CT Head without IV contrast. Multiplanar reformats. Dose reduction techniques were used.  2) Routine CT Cervical Spine without IV contrast. Multiplanar reformats. Dose reduction techniques were used.    FINDINGS:   HEAD CT:   INTRACRANIAL CONTENTS: No finding for intracranial hemorrhage, mass, or acute infarct. Mild prominence of the lateral ventricles. Small amount of low-attenuation change is again seen within the periventricular white matter compatible with nondescript   gliosis. No transcortical areas of low attenuation change. No mass effect or midline shift.    Cerebellar tonsils are normally positioned. Sella is unremarkable for technique. Corpus callosum is normally formed.    VISUALIZED ORBITS/SINUSES/MASTOIDS: No intraorbital abnormality. No paranasal sinus mucosal disease. No middle ear or mastoid effusion.    BONES/SOFT TISSUES: Calvarium is intact, without fracture or suspicious lytic or blastic foci. Moderate-sized superficial soft tissue contusion/subgaleal hematoma seen along the posterior superior parietal scalp.    CERVICAL SPINE CT:   VERTEBRA: Lateral alignment is essentially normal. There is reversal of the normal cervical lordosis with grade I anterolisthesis of C3 on C4 and C4 on C5 and subtle anterolisthesis of C7 on T1. Listhesis at these levels is age-indeterminate but favored   to be chronic and degenerative in etiology given congruent appearance of associated facet degenerative change at these levels and lack of associated prevertebral soft tissue swelling.    Craniocervical junction is intact. Mild degenerative change anterior C1-C2 articulation. There is severe interspace narrowing at C5-C6  and C6-C7 with mild associated endplate irregularity and extensive endplate sclerosis and prominent ventral spurring at   these levels. Mild prominent ventral spurring C4-C5 with mild interspace narrowing. Mild interspace narrowing C3-C4 and C7-T1.    Moderate or advanced facet arthropathy is seen on the left at the C2-C3 and C3-C4 levels with moderate left sided facet arthropathy at C7-T1 and T1-T2. Moderate right-sided facet arthropathy C4-C5, C7-T1, and T1-T2.     CANAL/FORAMINA: Posterior interbody ridging contributes to mild spinal canal stenosis at C5-C6. Moderate right foraminal stenosis C4-C5. Severe right and moderate left foraminal stenosis C5-C6, and moderate left and mild right foraminal stenosis C6-C7.    PARASPINAL: Dorsal paraspinal soft tissues are unremarkable. Visualized lung apices are clear.       Impression    IMPRESSION:  HEAD CT:  1.  Superficial soft tissue contusion/subgaleal hematoma is seen along the posterior superior parietal scalp.  2.  No finding for intracranial hemorrhage, mass, or acute infarct.  3.  Mild volume loss and presumed sequela chronic microvascular ischemic change.    CERVICAL SPINE CT:  1.  Moderate to advanced cervical spondylosis without finding for acute fracture.  2.  Grade I anterolisthesis C3 on C4, C4 on C5 and C7 on T1. Listhesis at these levels is age-indeterminate but favored to be chronic and degenerative in etiology. Correlate for cervical pain.     XR Chest 2 Views    Narrative    EXAM: XR CHEST 2 VIEWS  LOCATION: [facility]  DATE: 4/23/2025    INDICATION: fever  COMPARISON: None.      Impression    IMPRESSION: Technically limited exam. Heart size is likely enlarged. Prominence of central pulmonary vasculature and increased interstitial density suspicious for early edema. No lobar consolidation, effusions, or pneumothorax. Advanced degenerative   changes in both shoulders, left greater than right.         Will Alves DO MPH  [facility]  Hospitalist  [address] [facility] [place] [address]  04/24/2025

## 2025-04-24 NOTE — UTILIZATION REVIEW
Admission Status; Secondary Review Determination       Under the authority of the Utilization Management Committee, the utilization review process indicated a secondary review on the above patient. The review outcome is based on review of the medical records, discussions with staff, and applying clinical experience noted on the date of the review.     (x) Inpatient Status Appropriate - This patient's medical care is consistent with medical management for inpatient care and reasonable inpatient medical practice.     RATIONALE FOR DETERMINATION   Inpatient admission is appropriate for this patient. The patient presents with a mechanical fall, generalized weakness, and a new diagnosis of sepsis secondary to a urinary tract infection, which meets sepsis criteria (tachycardia, leukocytosis, lactic acidosis). Additionally, the patient experienced rapid atrial fibrillation requiring acute management, including rate control, anticoagulation initiation, and telemetry monitoring. The complexity of her medical issues, including sepsis management with IV antibiotics (ceftriaxone), monitoring for atrial fibrillation, and the need for physical therapy evaluation, indicates a level of care and monitoring that is expected to span at least 2 midnights.       The expected length of stay at the time of admission was more than 2 nights because of the severity of illness, intensity of service provided, and risk for adverse outcome. Inpatient admission is appropriate.         This document was produced using voice recognition software       The information on this document is developed by the utilization review team in order for the business office to ensure compliance. This only denotes the appropriateness of proper admission status and does not reflect the quality of care rendered.   The definitions of Inpatient Status and Observation Status used in making the determination above are those provided in the CMS Coverage Manual, Chapter  1 and Chapter 6, section 70.4.   Sincerely,   KAREN KAUR MD   System Medical Director   Utilization Management   [facility].

## 2025-04-24 NOTE — PLAN OF CARE
"Assumed care [id number]. A&Ox4. Ax2 with a lift for transfers. Patient reports she has spent a lot of time in bed recently and uses a walker at baseline- feels weak and fatigued. On RA. On a regular diet. C/O BLE leg cramps- given scheduled Flexeril with reports of little relief. Given PRN Atarax, Tylenol & Voltaren was applied this AM. On telemetry- SR. Using the bedpan overnight due to a reddened perineum area. Had x1 incontinence episode this AM otherwise using the bedpan as needed. Plan of care ongoing.     Goal Outcome Evaluation:      Plan of Care Reviewed With: patient    Overall Patient Progress: no changeOverall Patient Progress: no change    Outcome Evaluation: A&Ox4, on tele, IV abx, muscle relaxer given for leg cramps      Problem: Fatigue  Goal: Improved Activity Tolerance  Outcome: Not Progressing  Intervention: Promote Improved Energy  Recent Flowsheet Documentation  Taken 4/23/2025 2132 by Michaela Hatch, RN  Activity Management: activity adjusted per tolerance  Taken 4/23/2025 2100 by Michaela Hatch, RN  Activity Management: activity adjusted per tolerance     Problem: Adult Inpatient Plan of Care  Goal: Plan of Care Review  Description: The Plan of Care Review/Shift note should be completed every shift.  The Outcome Evaluation is a brief statement about your assessment that the patient is improving, declining, or no change.  This information will be displayed automatically on your shiftnote.  Outcome: Progressing  Flowsheets (Taken 4/24/2025 0041)  Outcome Evaluation: A&Ox4, on tele, IV abx, muscle relaxer given for leg cramps  Plan of Care Reviewed With: patient  Overall Patient Progress: no change  Goal: Patient-Specific Goal (Individualized)  Description: You can add care plan individualizations to a care plan. Examples of Individualization might be:  \"Parent requests to be called daily at 9am for status\", \"I have a hard time hearing out of my right ear\", or \"Do not touch me to wake me up as " "it startlesme\".  Outcome: Progressing  Goal: Absence of Hospital-Acquired Illness or Injury  Outcome: Progressing  Intervention: Identify and Manage Fall Risk  Recent Flowsheet Documentation  Taken 4/23/2025 2100 by Michaela Hatch RN  Safety Promotion/Fall Prevention:   activity supervised   lighting adjusted   room door open   room near nurse's station   safety round/check completed  Intervention: Prevent Skin Injury  Recent Flowsheet Documentation  Taken 4/23/2025 2100 by Michaela Hatch RN  Body Position: position changed independently  Intervention: Prevent Infection  Recent Flowsheet Documentation  Taken 4/23/2025 2100 by Michaela Hatch RN  Infection Prevention:   equipment surfaces disinfected   hand hygiene promoted   personal protective equipment utilized   rest/sleep promoted   single patient room provided  Goal: Optimal Comfort and Wellbeing  Outcome: Progressing  Intervention: Monitor Pain and Promote Comfort  Recent Flowsheet Documentation  Taken 4/23/2025 2336 by Michaela Hatch RN  Pain Management Interventions: medication (see MAR)  Goal: Readiness for Transition of Care  Outcome: Progressing  Intervention: Mutually Develop Transition Plan  Recent Flowsheet Documentation  Taken 4/23/2025 2100 by Michaela Hatch RN  Equipment Currently Used at Home:   raised toilet seat   walker, rolling     Problem: Delirium  Goal: Optimal Coping  Outcome: Progressing  Goal: Improved Behavioral Control  Outcome: Progressing  Intervention: Minimize Safety Risk  Recent Flowsheet Documentation  Taken 4/23/2025 2100 by Michaela Hatch RN  Enhanced Safety Measures: room near unit station  Goal: Improved Attention and Thought Clarity  Outcome: Progressing  Goal: Improved Sleep  Outcome: Progressing     Problem: Skin Injury Risk Increased  Goal: Skin Health and Integrity  Outcome: Progressing  Intervention: Plan: Nurse Driven Intervention: Moisture Management  Recent Flowsheet Documentation  Taken 4/23/2025 " 2100 by Michaela Hatch RN  Moisture Interventions: (Powder applied to moist folds)   Encourage regular toileting   No brief in bed   Incontinence pad   Barrier ointment (CriticAid, Triad paste)  Intervention: Plan: Nurse Driven Intervention: Friction and Shear  Recent Flowsheet Documentation  Taken 4/23/2025 2100 by Michaela Hatch RN  Friction/Shear Interventions:   HOB 30 degrees or less   Assistive lifting device (portable/ceiling lift, etc.)  Intervention: Optimize Skin Protection  Recent Flowsheet Documentation  Taken 4/23/2025 2132 by Michaela Hatch RN  Activity Management: activity adjusted per tolerance  Taken 4/23/2025 2100 by Michaela Hatch RN  Activity Management: activity adjusted per tolerance  Head of Bed (HOB) Positioning: HOB at 20-30 degrees     Problem: Dysrhythmia  Goal: Normalized Cardiac Rhythm  Outcome: Progressing     Problem: Fall Injury Risk  Goal: Absence of Fall and Fall-Related Injury  Outcome: Progressing  Intervention: Identify and Manage Contributors  Recent Flowsheet Documentation  Taken 4/23/2025 2100 by Michaela Hatch RN  Medication Review/Management: medications reviewed  Intervention: Promote Injury-Free Environment  Recent Flowsheet Documentation  Taken 4/23/2025 2100 by Michaela Hatch RN  Safety Promotion/Fall Prevention:   activity supervised   lighting adjusted   room door open   room near nurse's station   safety round/check completed

## 2025-04-24 NOTE — CONSULTS
CLINICAL NUTRITION SERVICES - ASSESSMENT NOTE    RECOMMENDATIONS FOR MDs/PROVIDERS TO ORDER:  None at this time    Registered Dietitian Interventions:  Supplements ordered: Ensure once daily for patient to trial   Provided education on eating small frequent meals with oral nutrition supplements (Boost/Ensure/Protein shakes) to ensure adequate intake       REASON FOR ASSESSMENT  Positive admission nutrition risk screen    PMH: osteoarthritis     Patient admitted for with a fall and generalized weakness with workup notable for urinary tract infection, rapid atrial fibrillation, and hypothyroidism.     INFORMATION OBTAINED  Assessed patient in room.    NUTRITION HISTORY  Patient has been essentially bed bound for the past month d/t weakness from the flu. Has only been eating 1 meal in the evening time. She lives with her son who will help her with shopping and meal prep.   In general, she has not been eating as much as she used to eat, she now eats smaller meals because it doesn't sit well in her stomach - she doesn't specify how long this has been going on for.   Reports that she has been increasingly weak and that her clothes are fitting more loosely, but does not know how much weight she has lost or her UBW.     CURRENT NUTRITION ORDERS  Diet: Regular    CURRENT INTAKE/TOLERANCE  100% intakes are documented. Patient reports that her appetite is at baseline now, reports good tolerance to diet.  Has no nutrition concerns at the moment.     RD discussed importance of adequate intakes to support immune function, strength, and healing. Encouraged eating small frequent meals/snacks to increase appetite and intake. Discussed incorporating at least 1 Ensure/Boost oral or protein drink supplement daily at discharge to help her meet her nutrition needs. Offered a trial of Ensure while admitted, patient was agreeable.     LABS  Nutrition-relevant labs:  Cr 1.02, GFR 59    MEDICATIONS  Nutrition-relevant medications:  "Reviewed    ANTHROPOMETRICS  Height: 172.7 cm (5' 8\")  Admission Weight: 119.2 kg (262 lb 12.6 oz) (04/23/25 1540)   Most Recent Weight: 120.1 kg (264 lb 12.4 oz) (04/23/25 2100)  IBW: 140 lbs  BMI: Body mass index is 40.26 kg/m .   Weight History: DEBRA d/t lack of wt hx  Wt Readings from Last 20 Encounters:   04/23/25 120.1 kg (264 lb 12.4 oz)   11/29/16 (!) 155.9 kg (343 lb 9.6 oz)   11/09/15 132 kg (291 lb)   10/23/15 129.6 kg (285 lb 11.2 oz)   06/20/15 128.2 kg (282 lb 9.6 oz)   05/18/15 130.6 kg (288 lb)   04/30/15 (!) 137 kg (302 lb)   04/18/15 136.1 kg (300 lb)   04/09/15 (!) 140.3 kg (309 lb 6.4 oz)   04/06/15 133.8 kg (295 lb)   03/17/15 135.9 kg (299 lb 9 oz)    - no other information per CE    ASSESSED NUTRITION NEEDS  Dosing Weight: 120 kg, based on actual wt for energy, 64 kg IBW for protein   Estimated Energy Needs: 1770+ kcals/day ([place] Jeor)  Justification: Obese  Estimated Protein Needs:  grams protein/day (1.5 - 2 grams of pro/kg)  Justification: Obesity guidelines  Estimated Fluid Needs: 1 mL/kcal  Justification: Maintenance    SYSTEM AND PHYSICAL FINDINGS    GI: Reviewed    Skin/wounds: no documentation of PI; no documentation of edema    MALNUTRITION  % Intake: </=50% for >/= 1 month (severe)  % Weight Loss: Unable to assess d/t lack of wt hx  Subcutaneous Fat Loss: None observed  Muscle Loss:  mild global depletions   Fluid Accumulation/Edema: None noted  Malnutrition Diagnosis: Unable to determine due to lack of wt hx  Malnutrition Present on Admission: Unable to assess    NUTRITION DIAGNOSIS  Inadequate oral intake related to acute illness, poor appetite as evidenced by pt report.     INTERVENTIONS  Medical food supplement therapy  Nutrition counseling strategies    GOALS  Patient to consume % of nutritionally adequate meal trays TID, or the equivalent with supplements/snacks.     MONITORING/EVALUATION  Progress toward goals will be monitored and evaluated per " "policy.      Caitie Palma, MS, RD, LD  [facility] Message Group: \"Dietitian [[place]]\"  Office Phone: [phone]  Pagers: 3rd floor/ICU: [phone]  All other floors: [phone]  Weekend/holiday: [phone]    "

## 2025-04-25 ENCOUNTER — APPOINTMENT (OUTPATIENT)
Dept: OCCUPATIONAL THERAPY | Facility: CLINIC | Age: 70
DRG: 872 | End: 2025-04-25
Attending: INTERNAL MEDICINE
Payer: COMMERCIAL

## 2025-04-25 ENCOUNTER — APPOINTMENT (OUTPATIENT)
Dept: PHYSICAL THERAPY | Facility: CLINIC | Age: 70
DRG: 872 | End: 2025-04-25
Payer: COMMERCIAL

## 2025-04-25 LAB
ATRIAL RATE - MUSE: 141 BPM
ATRIAL RATE - MUSE: 83 BPM
DIASTOLIC BLOOD PRESSURE - MUSE: NORMAL MMHG
DIASTOLIC BLOOD PRESSURE - MUSE: NORMAL MMHG
INTERPRETATION ECG - MUSE: NORMAL
INTERPRETATION ECG - MUSE: NORMAL
MAGNESIUM SERPL-MCNC: 1.7 MG/DL (ref 1.7–2.3)
P AXIS - MUSE: 65 DEGREES
P AXIS - MUSE: NORMAL DEGREES
PHOSPHATE SERPL-MCNC: 2.9 MG/DL (ref 2.5–4.5)
POTASSIUM SERPL-SCNC: 3.6 MMOL/L (ref 3.4–5.3)
PR INTERVAL - MUSE: 136 MS
PR INTERVAL - MUSE: 146 MS
QRS DURATION - MUSE: 76 MS
QRS DURATION - MUSE: 82 MS
QT - MUSE: 312 MS
QT - MUSE: 374 MS
QTC - MUSE: 439 MS
QTC - MUSE: 477 MS
R AXIS - MUSE: 39 DEGREES
R AXIS - MUSE: 44 DEGREES
SYSTOLIC BLOOD PRESSURE - MUSE: NORMAL MMHG
SYSTOLIC BLOOD PRESSURE - MUSE: NORMAL MMHG
T AXIS - MUSE: 10 DEGREES
T AXIS - MUSE: 15 DEGREES
VENTRICULAR RATE- MUSE: 141 BPM
VENTRICULAR RATE- MUSE: 83 BPM

## 2025-04-25 PROCEDURE — 97530 THERAPEUTIC ACTIVITIES: CPT | Mod: GP

## 2025-04-25 PROCEDURE — 250N000013 HC RX MED GY IP 250 OP 250 PS 637: Performed by: INTERNAL MEDICINE

## 2025-04-25 PROCEDURE — 99232 SBSQ HOSP IP/OBS MODERATE 35: CPT | Performed by: HOSPITALIST

## 2025-04-25 PROCEDURE — 250N000013 HC RX MED GY IP 250 OP 250 PS 637: Performed by: HOSPITALIST

## 2025-04-25 PROCEDURE — 83735 ASSAY OF MAGNESIUM: CPT | Performed by: HOSPITALIST

## 2025-04-25 PROCEDURE — 97165 OT EVAL LOW COMPLEX 30 MIN: CPT | Mod: GO | Performed by: OCCUPATIONAL THERAPIST

## 2025-04-25 PROCEDURE — 97535 SELF CARE MNGMENT TRAINING: CPT | Mod: GO | Performed by: OCCUPATIONAL THERAPIST

## 2025-04-25 PROCEDURE — 250N000011 HC RX IP 250 OP 636: Performed by: INTERNAL MEDICINE

## 2025-04-25 PROCEDURE — 84100 ASSAY OF PHOSPHORUS: CPT | Performed by: HOSPITALIST

## 2025-04-25 PROCEDURE — 84132 ASSAY OF SERUM POTASSIUM: CPT | Performed by: HOSPITALIST

## 2025-04-25 PROCEDURE — 120N000001 HC R&B MED SURG/OB

## 2025-04-25 PROCEDURE — 36415 COLL VENOUS BLD VENIPUNCTURE: CPT | Performed by: HOSPITALIST

## 2025-04-25 RX ADMIN — Medication 1 SPRAY: at 08:39

## 2025-04-25 RX ADMIN — LEVOTHYROXINE SODIUM 25 MCG: 0.03 TABLET ORAL at 08:31

## 2025-04-25 RX ADMIN — CEFTRIAXONE 2 G: 2 INJECTION, POWDER, FOR SOLUTION INTRAMUSCULAR; INTRAVENOUS at 19:52

## 2025-04-25 RX ADMIN — APIXABAN 5 MG: 5 TABLET, FILM COATED ORAL at 19:50

## 2025-04-25 RX ADMIN — Medication 1 SPRAY: at 12:21

## 2025-04-25 RX ADMIN — ACETAMINOPHEN 975 MG: 325 TABLET, FILM COATED ORAL at 19:50

## 2025-04-25 RX ADMIN — Medication 1 SPRAY: at 17:36

## 2025-04-25 RX ADMIN — Medication 1 SPRAY: at 19:56

## 2025-04-25 RX ADMIN — APIXABAN 5 MG: 5 TABLET, FILM COATED ORAL at 08:31

## 2025-04-25 ASSESSMENT — ACTIVITIES OF DAILY LIVING (ADL)
ADLS_ACUITY_SCORE: 62
ADLS_ACUITY_SCORE: 66
ADLS_ACUITY_SCORE: 66
ADLS_ACUITY_SCORE: 62
ADLS_ACUITY_SCORE: 66
ADLS_ACUITY_SCORE: 62
ADLS_ACUITY_SCORE: 66
ADLS_ACUITY_SCORE: 62
ADLS_ACUITY_SCORE: 66
ADLS_ACUITY_SCORE: 62
ADLS_ACUITY_SCORE: 66
ADLS_ACUITY_SCORE: 62
ADLS_ACUITY_SCORE: 62

## 2025-04-25 NOTE — PLAN OF CARE
"Goal Outcome Evaluation:      Plan of Care Reviewed With: patient    Overall Patient Progress: no changeOverall Patient Progress: no change    Outcome Evaluation: Pt alert and orientated. on RA. Tele: SR. Denies pain. Up assist of 2 johanna steady to bedside commode. IV SL. Recieving rocephin. Planning to discharge today to [facility]    Temp: 99  F (37.2  C) Temp src: Oral BP: 132/66 Pulse: 81   Resp: 18 SpO2: 98 % O2 Device: None (Room air)         Problem: Adult Inpatient Plan of Care  Goal: Plan of Care Review  Description: The Plan of Care Review/Shift note should be completed every shift.  The Outcome Evaluation is a brief statement about your assessment that the patient is improving, declining, or no change.  This information will be displayed automatically on your shiftnote.  4/25/2025 0530 by Paola Gaxiola RN  Outcome: Not Progressing  Flowsheets (Taken 4/25/2025 0530)  Outcome Evaluation: Pt alert and orientated. on RA. Tele: SR. Denies pain. IV SL. Recieving rocephin. Planning to discharge today to [facility]  Plan of Care Reviewed With: patient  Overall Patient Progress: no change  4/25/2025 0031 by Paola Gaxiola RN  Outcome: Not Progressing  Flowsheets (Taken 4/25/2025 0031)  Plan of Care Reviewed With: patient  Overall Patient Progress: no change  Goal: Patient-Specific Goal (Individualized)  Description: You can add care plan individualizations to a care plan. Examples of Individualization might be:  \"Parent requests to be called daily at 9am for status\", \"I have a hard time hearing out of my right ear\", or \"Do not touch me to wake me up as it startlesme\".  4/25/2025 0530 by Paola Gaxiola RN  Outcome: Not Progressing  4/25/2025 0031 by Paola Gaxiola RN  Outcome: Not Progressing  Goal: Absence of Hospital-Acquired Illness or Injury  4/25/2025 0530 by Paola Gaxiola, RN  Outcome: Not Progressing  4/25/2025 0031 by Paola Gaxiola RN  Outcome: Not Progressing  Intervention: " Identify and Manage Fall Risk  Recent Flowsheet Documentation  Taken 4/25/2025 0000 by Paola Gaxiola RN  Safety Promotion/Fall Prevention:   safety round/check completed   nonskid shoes/slippers when out of bed  Intervention: Prevent Skin Injury  Recent Flowsheet Documentation  Taken 4/24/2025 2343 by Paola Gaxiola RN  Body Position: supine, legs elevated  Intervention: Prevent and Manage VTE (Venous Thromboembolism) Risk  Recent Flowsheet Documentation  Taken 4/25/2025 0000 by Paola Gaxiola RN  VTE Prevention/Management: SCDs off (sequential compression devices)  Intervention: Prevent Infection  Recent Flowsheet Documentation  Taken 4/25/2025 0000 by Paola Gaxiola RN  Infection Prevention:   rest/sleep promoted   hand hygiene promoted  Goal: Optimal Comfort and Wellbeing  4/25/2025 0530 by Paola Gaxiola RN  Outcome: Not Progressing  4/25/2025 0031 by Paola Gaxiola RN  Outcome: Not Progressing  Goal: Readiness for Transition of Care  4/25/2025 0530 by Paola Gaxiola RN  Outcome: Not Progressing  4/25/2025 0031 by Paola Gaxiola RN  Outcome: Not Progressing     Problem: Delirium  Goal: Optimal Coping  4/25/2025 0530 by Paola Gxaiola RN  Outcome: Not Progressing  4/25/2025 0031 by Paola Gaxiola RN  Outcome: Not Progressing  Goal: Improved Behavioral Control  4/25/2025 0530 by Paola Gaxiola RN  Outcome: Not Progressing  4/25/2025 0031 by Paola Gaxiola RN  Outcome: Not Progressing  Intervention: Minimize Safety Risk  Recent Flowsheet Documentation  Taken 4/25/2025 0000 by Paola Gaxiola RN  Enhanced Safety Measures: room near unit station  Goal: Improved Attention and Thought Clarity  4/25/2025 0530 by Paola Gaxiola RN  Outcome: Not Progressing  4/25/2025 0031 by Paola Gaxiola RN  Outcome: Not Progressing  Goal: Improved Sleep  4/25/2025 0530 by Paola Gaxiola RN  Outcome: Not Progressing  4/25/2025 0031 by Paola Gaxiola RN  Outcome: Not Progressing      Problem: Skin Injury Risk Increased  Goal: Skin Health and Integrity  4/25/2025 0530 by Paola Gaxiola RN  Outcome: Not Progressing  4/25/2025 0031 by Paola Gaxiola RN  Outcome: Not Progressing  Intervention: Plan: Nurse Driven Intervention: Moisture Management  Recent Flowsheet Documentation  Taken 4/25/2025 0000 by Paola Gaxiola RN  Moisture Interventions: Encourage regular toileting  Intervention: Plan: Nurse Driven Intervention: Friction and Shear  Recent Flowsheet Documentation  Taken 4/25/2025 0000 by Paola Gaxiola RN  Friction/Shear Interventions: HOB 30 degrees or less  Intervention: Optimize Skin Protection  Recent Flowsheet Documentation  Taken 4/24/2025 2343 by Paola Gaxiola RN  Activity Management: activity adjusted per tolerance  Head of Bed (HOB) Positioning: HOB at 20-30 degrees     Problem: Dysrhythmia  Goal: Normalized Cardiac Rhythm  4/25/2025 0530 by Paola Gaxiola RN  Outcome: Not Progressing  4/25/2025 0031 by Paola Gaxiola RN  Outcome: Not Progressing  Intervention: Monitor and Manage Cardiac Rhythm Effect  Recent Flowsheet Documentation  Taken 4/25/2025 0000 by Paola Gaxiola RN  VTE Prevention/Management: SCDs off (sequential compression devices)     Problem: Fatigue  Goal: Improved Activity Tolerance  4/25/2025 0530 by Paola Gaxiola RN  Outcome: Not Progressing  4/25/2025 0031 by Paola Gaxiola RN  Outcome: Not Progressing  Intervention: Promote Improved Energy  Recent Flowsheet Documentation  Taken 4/24/2025 2343 by Paola Gaxiola RN  Activity Management: activity adjusted per tolerance

## 2025-04-25 NOTE — PROGRESS NOTES
Care Management Follow Up    Length of Stay (days): 2    Expected Discharge Date: 04/26/2025     Concerns to be Addressed: discharge planning     Patient plan of care discussed at interdisciplinary rounds: Yes    Anticipated Discharge Disposition: Transitional Care     Patient/family educated on Medicare website which has current facility and service quality ratings: yes  Education Provided on the Discharge Plan:    Patient/Family in Agreement with the Plan: yes    Referrals Placed by CM/SW: Post Acute Facilities  Private pay costs discussed: transportation costs    Discussed  Partnership in Safe Discharge Planning  document with patient/family: No     Handoff Completed: No, handoff not indicated or clinically appropriate    Additional Information:  Pt will likely be ready for discharge to [facility] tomorrow. Weekend contact information for [facility], P: [phone], F: [phone].  Pt is requesting wheelchair transportation. Reviewed out of pocket cost for [facility] transport, $90.70 base and $6.08 per mile to the destination. Pt expressed understanding and is agreeable to this.    Transportation arranged for 4/26/2025 1337 to 1422.  Updated [facility] and pt of transportation time.    Next Steps: Send orders to [facility] when available.    Saundra Guevara RN   Inpatient Care Coordination  [facility]   Phone: [phone]

## 2025-04-25 NOTE — PROGRESS NOTES
"   04/25/25 0900   Appointment Info   Signing Clinician's Name / Credentials (OT) Roslyn Merlyn OTRL   Living Environment   People in Home child(elsie), adult   Current Living Arrangements house   Home Accessibility stairs to enter home;stairs within home   Number of Stairs, Main Entrance 1   Stair Railings, Main Entrance none   Number of Stairs, Within Home, Primary seven   Stair Railings, Within Home, Primary railings safe and in good condition   Living Environment Comments Pt lives w/ adult son in a split level home. 1 step through garage to enter house, 7 stairs up, 7 stairs down. Tub shower. No shower chair or railings in BR.   Self-Care   Usual Activity Tolerance fair   Current Activity Tolerance poor   Regular Exercise No   Equipment Currently Used at Home walker, rolling;raised toilet seat   Fall history within last six months yes   Number of times patient has fallen within last six months 1   Activity/Exercise/Self-Care Comment Pt amb w/ [facility], recieves assistance from son for transfers and ADLs as needed. Pt has been near \"bed-bound\" as of recently d/t weakness.   Instrumental Activities of Daily Living (IADL)   IADL Comments Prior to admission, pt family completing IADLS due to pt fatigue   General Information   Onset of Illness/Injury or Date of Surgery 04/23/25   Referring Physician Raul Shepard MD   Patient/Family Therapy Goal Statement (OT) Pt would like to DC to TCU and return to prior level of function   Additional Occupational Profile Info/Pertinent History of Current Problem 70 year old female with a history of osteoarthritis admitted on 4/23/2025 with a fall and generalized weakness with workup notable for urinary tract infection, rapid atrial fibrillation, and hypothyroidism.     Problem List:   1. Mechanical fall and generalized weakness: Her UTI may be contributing.  Will treat her UTI and manage her atrial fibrillation and hypothyroidism.  Will request PT consultation.  She lives with her " son who helps her with her ADLs.   2. Sepsis secondary to urinary tract infection: Technically meets criteria for sepsis with tachycardia, leukocytosis, and lactic acidosis.  Urine culture is pending but we will continue treatment with ceftriaxone.  3. Rapid atrial fibrillation: Heart rate is now controlled and back into a normal sinus rhythm.  Possibly driven by her hypothyroidism and UTI.  Will hold off on AV norman blocking agents for now.  [id number] is elevated and she meets criteria for full anticoagulation so we will initiate Eliquis.  Risks of bleeding were discussed with the patient and her son who agree with initiating anticoagulation.  Will check an echocardiogram and monitor on telemetry.  Consider Zio patch on discharge.  4. Overt hypothyroidism: Patient is a high TSH and a low free T4.  Started on levothyroxine 25 mcg daily.   Existing Precautions/Restrictions fall   Limitations/Impairments safety/cognitive   Cognitive Status Examination   Orientation Status orientation to person, place and time   Follows Commands WFL   Memory Deficit minimal deficit   Cognitive Status Comments Pt endorses decline in short term memory over the last month, appears WFL this date. Will monitor   Visual Perception   Visual Impairment/Limitations corrective lenses full-time   Sensory   Sensory Comments Pt reports intact   Pain Assessment   Patient Currently in Pain Yes, see Vital Sign flowsheet   Posture   Posture forward head position;protracted shoulders   Range of Motion Comprehensive   Comment, General Range of Motion Shoulder flexion limited to approximately 95 degrees at baseline, trunk flexion limited due to body habitus and edema   Strength Comprehensive (MMT)   Comment, General Manual Muscle Testing (MMT) Assessment Global weakness, poor tolerance for activity   Bed Mobility   Comment (Bed Mobility) Max assist   Transfers   Transfer Comments Max assist   Balance   Balance Comments Impaired in standign   Activities  of Daily Living   BADL Assessment/Intervention bathing;lower body dressing;grooming;toileting   Bathing Assessment/Intervention   Comment, (Bathing) Dependent per clinical reasoning   Lower Body Dressing Assessment/Training   Comment, (Lower Body Dressing) Max assist EOB   Grooming Assessment/Training   Comment, (Grooming) Poor tolerance for activity in sitting   Toileting   Comment, (Toileting) Max assist   Clinical Impression   Criteria for Skilled Therapeutic Interventions Met (OT) Yes, treatment indicated   OT Diagnosis Decline in ADL independence and safety   Influenced by the following impairments UTI sepsis   OT Problem List-Impairments impacting ADL problems related to;activity tolerance impaired;balance;cognition;mobility;range of motion (ROM);strength   Assessment of Occupational Performance 5 or more Performance Deficits   Identified Performance Deficits Impaired dressing bathing toileting and grooming tolerance   Planned Therapy Interventions (OT) ADL retraining;cognition;progressive activity/exercise   Clinical Decision Making Complexity (OT) problem focused assessment/low complexity   Risk & Benefits of therapy have been explained evaluation/treatment results reviewed;care plan/treatment goals reviewed;risks/benefits reviewed;current/potential barriers reviewed;participants voiced agreement with care plan;participants included;patient   OT Total Evaluation Time   OT Eval, Low Complexity Minutes (58359) 9   OT Goals   Therapy Frequency (OT) 5 times/week   OT Predicted Duration/Target Date for Goal Attainment 05/04/25   OT Goals Hygiene/Grooming;Lower Body Dressing;Lower Body Bathing;Toilet Transfer/Toileting;Cognition   OT: Hygiene/Grooming modified independent;from wheelchair   OT: Lower Body Dressing Modified independent;using adaptive equipment;including set-up/clothing retrieval;from wheelchair   OT: Lower Body Bathing Modified independent;using adaptive equipment   OT: Toilet Transfer/Toileting  Modified independent;toilet transfer;cleaning and garment management;using adaptive equipment   OT: Cognitive Patient/caregiver will verbalize understanding of cognitive assessment results/recommendations as needed for safe discharge planning   Self-Care/Home Management   Self-Care/Home Mgmt/ADL, Compensatory, Meal Prep Minutes (58334) 26   Symptoms Noted During/After Treatment (Meal Preparation/Planning Training) fatigue;increased pain;shortness of breath   Treatment Detail/Skilled Intervention OT; Pt agreeable to therapy. Pt completed supine to sit w ith cues for technique, bed features and max assist for trunk support, SBA for LE. Pt required seated rest break EOB. Pt educated on figure four for LE dressing independence, pt unable to maintain seated balance and bring LE into position with assist. Pt educated on reacher and sock aid to improve LE dressing independence, pt reporting understanding. Pt completed sit to stand with MAx assist and SS with cues for technique. Pt transferred to chair with SS. Pt completed sit to stand from SS seat with max assist, attempted to complete upright posture with assist at posterior and chest, able to maintain for <1 minute with assist to improve upright posture needed for toileting independence. Pt compelted stand to sit with max assist to control descent. Pt in chair, required max assist to comb the back of her head due to UE fatigue. PT cued for UE positioning on chair and table to improve UE independence and tolerance., Pt in chair with  alarm onand call light upon TO departure   OT Discharge Planning   OT Plan Grooming seated EOB, SS with Ax2, monitor cog   OT Discharge Recommendation (DC Rec) Transitional Care Facility   OT Rationale for DC Rec Patient presents significantly below baseline with ADLs and mobility requiring max assist and SS for transfers and max assist for ADLS. Pt would benefit from continued OT in TCU to return to prior level of function   OT Brief  overview of current status Max assist SS   OT Total Distance Amb During Session (feet) 0   Total Session Time   Timed Code Treatment Minutes 26   Total Session Time (sum of timed and untimed services) 35

## 2025-04-25 NOTE — PLAN OF CARE
"Goal Outcome Evaluation:      Plan of Care Reviewed With: patient          Outcome Evaluation: A&Ox4. VSS. Assist x 2 with Savita Steady. [place]. On room air. Denies pain, nausea or SOB. On electrolytes protocol. Tele SR. ABX Rocephin for UTI, Taking eliquis for A Fib. Posible discharge tomorrow to TCU. SW following. Tele SR    BLE 1 to 2 edema present.    Problem: Adult Inpatient Plan of Care  Goal: Plan of Care Review  Description: The Plan of Care Review/Shift note should be completed every shift.  The Outcome Evaluation is a brief statement about your assessment that the patient is improving, declining, or no change.  This information will be displayed automatically on your shiftnote.  Outcome: Progressing  Flowsheets (Taken 4/25/2025 1347)  Outcome Evaluation: A&Ox4. VSS. Assist x 2 with Savita Steady. [place]. On room air. Denies pain, nausea or SOB. On electrolytes protocol. Tele SR. ABX Rocephin for UTI, Taking eliquis for A Fib. Posible discharge tomorrow to TCU. SW following. Tele SR  Plan of Care Reviewed With: patient  Goal: Patient-Specific Goal (Individualized)  Description: You can add care plan individualizations to a care plan. Examples of Individualization might be:  \"Parent requests to be called daily at 9am for status\", \"I have a hard time hearing out of my right ear\", or \"Do not touch me to wake me up as it startlesme\".  Outcome: Progressing  Goal: Absence of Hospital-Acquired Illness or Injury  Outcome: Progressing  Intervention: Identify and Manage Fall Risk  Recent Flowsheet Documentation  Taken 4/25/2025 0832 by Allison Mota, RN  Safety Promotion/Fall Prevention:   activity supervised   clutter free environment maintained   nonskid shoes/slippers when out of bed   safety round/check completed  Intervention: Prevent Skin Injury  Recent Flowsheet Documentation  Taken 4/25/2025 0832 by Allison Mota, RN  Body Position: supine, head elevated  Intervention: Prevent and Manage VTE (Venous Thromboembolism) " Risk  Recent Flowsheet Documentation  Taken 4/25/2025 0832 by Allison Mota, RN  VTE Prevention/Management: SCDs off (sequential compression devices)  Intervention: Prevent Infection  Recent Flowsheet Documentation  Taken 4/25/2025 0832 by Allison Mota, RN  Infection Prevention:   hand hygiene promoted   single patient room provided  Goal: Optimal Comfort and Wellbeing  Outcome: Progressing  Intervention: Provide Person-Centered Care  Recent Flowsheet Documentation  Taken 4/25/2025 0832 by Allison Mota, RN  Trust Relationship/Rapport: care explained  Goal: Readiness for Transition of Care  Outcome: Progressing

## 2025-04-25 NOTE — PROGRESS NOTES
[facility]    Hospitalist Progress Note  Name: Gi Zhao    MRN: [id number]  Provider:  Will Alves DO, MPH  Date of Service: 04/25/2025    Summary of Stay: Gi Zhao is a 70 year old female with a history of osteoarthritis admitted on 4/23/2025 with a fall and generalized weakness with workup notable for urinary tract infection, rapid atrial fibrillation, and hypothyroidism.    Problem List:   Mechanical fall and generalized weakness: Her UTI may be contributing.  Will treat her UTI and manage her atrial fibrillation and hypothyroidism.  PT consulted, planning to discharge to TCU.  She lives with her son who helps her with her ADLs.   Sepsis secondary to urinary tract infection: Technically meets criteria for sepsis with tachycardia, leukocytosis, and lactic acidosis.  Urine culture is pending but we will continue treatment with ceftriaxone.  Rapid atrial fibrillation: Heart rate is now controlled and back into a normal sinus rhythm.  Possibly driven by her hypothyroidism and UTI.  Will hold off on AV norman blocking agents for now.  [id number] is elevated and she meets criteria for full anticoagulation so we initiated Eliquis.  Risks of bleeding were discussed with the patient and her son who agree with initiating anticoagulation.  Echocardiogram unremarkable.  Planning Zio patch on discharge.  Overt hypothyroidism: Patient is a high TSH and a low free T4.  Started on levothyroxine 25 mcg daily.    DVT Prophylaxis: DOAC  Code Status: Full Code  Diet: Combination Diet Regular Diet Adult  Snacks/Supplements Adult: Ensure Enlive; With Meals    Ryan Catheter: Not present  Disposition: Expected discharge in 1 days to TCU. Goals prior to discharge include follow-up UC and placement.   Incidental Findings: None.  Family updated today: Called son Bahman and no response.    40 MINUTES SPENT BY ME on the date of service doing chart review, history, exam, documentation & further activities  per the note.      Interval History   The patient reports doing well. No chest pain or shortness of breath. No nausea, vomiting, diarrhea, constipation. No fevers. No other specific complaints identified.     -Data reviewed today: I personally reviewed all new labs and imaging results over the last 24 hours.     Physical Exam   Temp: 98.4  F (36.9  C) Temp src: Oral BP: 125/65 Pulse: 81   Resp: 18 SpO2: 97 % O2 Device: None (Room air)    Vitals:    04/23/25 1540 04/23/25 2100   Weight: 119.2 kg (262 lb 12.6 oz) 120.1 kg (264 lb 12.4 oz)     Vital Signs with Ranges  Temp:  [98.3  F (36.8  C)-99  F (37.2  C)] 98.4  F (36.9  C)  Pulse:  [81-90] 81  Resp:  [18-20] 18  BP: (125-132)/(61-66) 125/65  SpO2:  [97 %-98 %] 97 %  I/O last 3 completed shifts:  In: 1060 [P.O.:1060]  Out: -     GENERAL: No apparent distress. Awake, alert, and fully oriented.  HEENT: Normocephalic, atraumatic. Extraocular movements intact.  CARDIOVASCULAR: Regular rate and rhythm without murmurs or rubs. No S3.  PULMONARY: Clear bilaterally.  GASTROINTESTINAL: Soft, non-tender, non-distended. Bowel sounds normoactive.   EXTREMITIES: No cyanosis or clubbing. No edema.  NEUROLOGICAL: CN 2-12 grossly intact, no focal neurological deficits.  DERMATOLOGICAL: No rash, ulcer, bruising, nor jaundice.     Medications   Current Facility-Administered Medications   Medication Dose Route Frequency Provider Last Rate Last Admin     Current Facility-Administered Medications   Medication Dose Route Frequency Provider Last Rate Last Admin    apixaban ANTICOAGULANT (ELIQUIS) tablet 5 mg  5 mg Oral BID Will Alves DO   5 mg at 04/25/25 0831    artificial saliva (BIOTENE MT) solution 1 spray  1 spray Mouth/Throat 4x Daily Raul Shepard MD   1 spray at 04/25/25 0839    cefTRIAXone (ROCEPHIN) 2 g vial to attach to  ml bag for ADULTS or NS 50 ml bag for PEDS  2 g Intravenous Q24H Raul Shepard MD   2 g at 04/24/25 2043    levothyroxine  "(SYNTHROID/LEVOTHROID) tablet 25 mcg  25 mcg Oral QAM AC Raul Shepard MD   25 mcg at 25 0831    sodium chloride (PF) 0.9% PF flush 3 mL  3 mL Intracatheter Q8H [facility] Raul Shepard MD   3 mL at 25 1438     Data     Laboratory:  Recent Labs   Lab 25  0709 25  1553   WBC 7.9 12.6*   HGB 11.4* 12.4   HCT 34.6* 37.3   MCV 93 94    172     Recent Labs   Lab 25  0719 25  0709 25  1553   NA  --  141 141   POTASSIUM 3.6 4.0 4.3   CHLORIDE  --  108* 107   CO2  --  20* 20*   ANIONGAP  --  13 14   GLC  --  99 135*   BUN  --  16.3 16.6   CR  --  1.02* 1.17*   GFRESTIMATED  --  59* 50*   MARTHA  --  9.2 9.3     No results for input(s): \"CULT\" in the last 168 hours.    Imaging:  Recent Results (from the past 24 hours)   Echocardiogram Complete   Result Value    LVEF  60-65%    Narrative    [id number]  [id number]  [id number]^ALEC^[place]^ROYCE     [facility]  Echocardiography Laboratory  [address] [place] [address]     Name: JOHNNIE MORRIS  MRN: [id number]  : 1955  Study Date: 2025 01:55 PM  Age: 70 yrs  Gender: Female  Patient Location: [facility]  Reason For Study: CHF  Ordering Physician: GE[place] MICHAEL  Referring Physician: Nicol Ref-Primary, Physician  Performed By: Soila Almonte RDCS     BSA: 2.3 m2  Height: 68 in  Weight: 264 lb  HR: 82  BP: 152/71 mmHg  ______________________________________________________________________________  Procedure  Echocardiogram with two-dimensional, color and spectral Doppler. Optison (NDC  #[id number]) given intravenously. Adequate quality two-dimensional was  performed and interpreted.  ______________________________________________________________________________  Interpretation Summary     1. Normal left ventricular size and systolic function. Estimated ejection  fraction is 60 to 65%.  2. Normal right ventricular size and systolic function.  3. Mild left atrial enlargement.  4. Mild calcific " aortic valve stenosis. Aortic valve mean systolic gradient is  12 mmHg. Aortic valve area by Doppler is 1.7 cmÂ .  5. No prior exam available for comparison.  ______________________________________________________________________________  Left Ventricle  The left ventricle is normal in size. There is normal left ventricular wall  thickness. Left ventricular systolic function is normal. Left ventricular  diastolic function is indeterminate. The visual ejection fraction is 60-65%.  No regional wall motion abnormalities noted.     Right Ventricle  The right ventricle is normal in size and function.     Atria  The left atrium is mildly dilated. Right atrium not well visualized. There is  no color Doppler evidence of an atrial shunt.     Mitral Valve  The mitral valve leaflets are mildly thickened. There is trace mitral  regurgitation.     Tricuspid Valve  The tricuspid valve is not well visualized. There is trace tricuspid  regurgitation.     Aortic Valve  The aortic valve is not well visualized. No aortic regurgitation is present.  Mild calcific aortic valve stenosis. Aortic valve mean systolic gradient is 12  mmHg. Aortic valve area by Doppler is 1.7 cmÂ .     Pulmonic Valve  The pulmonic valve is not well visualized. There is no pulmonic valvular  regurgitation.     Vessels  The aortic root is normal size. Ascending aorta not well visualized. The  inferior vena cava was normal in size with preserved respiratory variability.     Pericardium  There is no pericardial effusion.     Rhythm  Sinus rhythm was noted.  ______________________________________________________________________________  MMode/2D Measurements & Calculations  IVSd: 1.2 cm     LVIDd: 4.8 cm  LVIDs: 3.0 cm  LVPWd: 1.1 cm  IVC diam: 1.5 cm  FS: 36.2 %  LV mass(C)d: 204.9 grams  LV mass(C)dI: 89.1 grams/m2  Ao root diam: 3.0 cm  LA dimension: 4.1 cm  LA/Ao: 1.4  LVOT diam: 2.0 cm  LVOT area: 3.3 cm2  Ao root diam index Ht(cm/m): 1.8  Ao root diam index  BSA (cm/m2): 1.3  LA Volume (BP): 80.0 ml  LA Volume Index (BP): 34.8 ml/m2  RV Base: 3.5 cm     RWT: 0.47  TAPSE: 1.8 cm     Doppler Measurements & Calculations  MV E max jean marie: 81.8 cm/sec  MV A max jean marie: 99.8 cm/sec  MV E/A: 0.82  MV dec slope: 543.3 cm/sec2  MV dec time: 0.15 sec  Ao V2 max: 239.7 cm/sec  Ao max P.0 mmHg  Ao V2 mean: 164.7 cm/sec  Ao mean P.3 mmHg  Ao V2 VTI: 48.1 cm  EDUARDO(I,D): 1.7 cm2  EDUARDO(V,D): 1.8 cm2  LV V1 max P.9 mmHg  LV V1 max: 131.4 cm/sec  LV V1 VTI: 25.7 cm  SV(LVOT): 84.0 ml  SI(LVOT): 36.5 ml/m2  AV Jean Marie Ratio (DI): 0.55  EDUARDO Index (cm2/m2): 0.76  E/E' avg: 10.4  Lateral E/e': 9.6  Medial E/e': 11.2  RV S Jean Marie: 12.1 cm/sec     ______________________________________________________________________________  Report approved by: Hayley Harmon MD on 2025 02:33 PM               Will Alves DO, MPH  [facility] Hospitalist  [address] [place] [address]  2025

## 2025-04-26 VITALS
RESPIRATION RATE: 18 BRPM | WEIGHT: 264.77 LBS | SYSTOLIC BLOOD PRESSURE: 120 MMHG | BODY MASS INDEX: 40.13 KG/M2 | HEART RATE: 76 BPM | HEIGHT: 68 IN | TEMPERATURE: 97.5 F | DIASTOLIC BLOOD PRESSURE: 57 MMHG | OXYGEN SATURATION: 98 %

## 2025-04-26 LAB
BACTERIA UR CULT: ABNORMAL
BACTERIA UR CULT: ABNORMAL
HOLD SPECIMEN: NORMAL
MAGNESIUM SERPL-MCNC: 1.8 MG/DL (ref 1.7–2.3)
PHOSPHATE SERPL-MCNC: 3 MG/DL (ref 2.5–4.5)
POTASSIUM SERPL-SCNC: 3.9 MMOL/L (ref 3.4–5.3)

## 2025-04-26 PROCEDURE — 250N000013 HC RX MED GY IP 250 OP 250 PS 637: Performed by: INTERNAL MEDICINE

## 2025-04-26 PROCEDURE — 250N000013 HC RX MED GY IP 250 OP 250 PS 637: Performed by: HOSPITALIST

## 2025-04-26 PROCEDURE — 83735 ASSAY OF MAGNESIUM: CPT | Performed by: HOSPITALIST

## 2025-04-26 PROCEDURE — 84100 ASSAY OF PHOSPHORUS: CPT | Performed by: HOSPITALIST

## 2025-04-26 PROCEDURE — 99239 HOSP IP/OBS DSCHRG MGMT >30: CPT | Performed by: HOSPITALIST

## 2025-04-26 PROCEDURE — 36415 COLL VENOUS BLD VENIPUNCTURE: CPT | Performed by: HOSPITALIST

## 2025-04-26 PROCEDURE — 84132 ASSAY OF SERUM POTASSIUM: CPT | Performed by: HOSPITALIST

## 2025-04-26 RX ORDER — LEVOTHYROXINE SODIUM 25 UG/1
25 TABLET ORAL
DISCHARGE
Start: 2025-04-27

## 2025-04-26 RX ORDER — CIPROFLOXACIN 500 MG/1
500 TABLET, FILM COATED ORAL EVERY 12 HOURS
DISCHARGE
Start: 2025-04-26 | End: 2025-04-29

## 2025-04-26 RX ORDER — CIPROFLOXACIN 500 MG/1
500 TABLET, FILM COATED ORAL EVERY 12 HOURS SCHEDULED
Status: DISCONTINUED | OUTPATIENT
Start: 2025-04-26 | End: 2025-04-26 | Stop reason: HOSPADM

## 2025-04-26 RX ADMIN — LEVOTHYROXINE SODIUM 25 MCG: 0.03 TABLET ORAL at 09:15

## 2025-04-26 RX ADMIN — APIXABAN 5 MG: 5 TABLET, FILM COATED ORAL at 09:15

## 2025-04-26 RX ADMIN — CIPROFLOXACIN 500 MG: 500 TABLET ORAL at 09:15

## 2025-04-26 ASSESSMENT — ACTIVITIES OF DAILY LIVING (ADL)
ADLS_ACUITY_SCORE: 62

## 2025-04-26 NOTE — DISCHARGE SUMMARY
"Hospitalist Discharge Summary  [facility]    Gi Zhao MRN# [id number]   YOB: 1955 Age: 70 year old     Date of Admission:  4/23/2025  Date of Discharge:  4/26/2025  Admitting Physician:  Raul Shepard MD  Discharge Physician:  Will Alves DO  Discharging Service:  Hospitalist     Primary Provider: No Ref-Primary, Physician          Discharge Diagnosis:     Mechanical fall  Generalized weakness  Sepsis secondary to UTI  Rapid atrial fibrillation  Hypothyroidism             Discharge Disposition:     Discharged to rehabilitation facility           Allergies:     Allergies   Allergen Reactions    Oxycontin [Oxycodone] GI Disturbance              Discharge Medications:        Review of your medicines        START taking        Dose / Directions   apixaban ANTICOAGULANT 5 MG tablet  Commonly known as: ELIQUIS  Indication: Atrial Fibrillation Not Caused By A Heart Valve Problem      Dose: 5 mg  Take 1 tablet (5 mg) by mouth 2 times daily.  Refills: 0     ciprofloxacin 500 MG tablet  Commonly known as: CIPRO  Indication: Urinary Tract Infection      Dose: 500 mg  Take 1 tablet (500 mg) by mouth every 12 hours for 3 days.  Refills: 0     levothyroxine 25 MCG tablet  Commonly known as: SYNTHROID/LEVOTHROID      Dose: 25 mcg  Start taking on: April 27, 2025  Take 1 tablet (25 mcg) by mouth every morning (before breakfast).  Refills: 0            CONTINUE these medicines which have NOT CHANGED        Dose / Directions   Tylenol 8 Hour Arthritis Pain 650 MG CR tablet  Generic drug: acetaminophen      Dose: 1,300 mg  Take 1,300 mg by mouth every 8 hours as needed for mild pain or fever.  Refills: 0                      Condition on Discharge:     Discharge condition: Stable   Discharge vitals: Blood pressure 120/57, pulse 76, temperature 97.5  F (36.4  C), temperature source Oral, resp. rate 18, height 1.727 m (5' 8\"), weight 120.1 kg (264 lb 12.4 oz), SpO2 98%.   Code status " on discharge: Full Code      BASIC PHYSICAL EXAMINATION:  GENERAL: No apparent distress.  CARDIOVASCULAR: Regular rate and rhythm without murmurs.  PULMONARY: Clear to auscultation bilaterally.   GASTROINTESTINAL: Abdomen soft, non-tender.  EXTREMITIES: No edema, pulses intact.  NEUROLOGIC: No focal deficits.            History of Illness:   See detailed admission note for full details.               Procedures excluding imaging which is summarized below:     Please see details in the electronic medical record.           Consultations:     PHYSICAL THERAPY ADULT IP CONSULT  OCCUPATIONAL THERAPY ADULT IP CONSULT  CARE MANAGEMENT / SOCIAL WORK IP CONSULT  WOUND OSTOMY CONTINENCE NURSE  IP CONSULT  PHARMACY DISCHARGE EDUCATION BY PHARMACIST  OCCUPATIONAL THERAPY ADULT IP CONSULT  PHYSICAL THERAPY ADULT IP CONSULT          Significant Results:     Results for orders placed or performed during the hospital encounter of 04/23/25   XR Chest 2 Views    Narrative    EXAM: XR CHEST 2 VIEWS  LOCATION: [facility]  DATE: 4/23/2025    INDICATION: fever  COMPARISON: None.      Impression    IMPRESSION: Technically limited exam. Heart size is likely enlarged. Prominence of central pulmonary vasculature and increased interstitial density suspicious for early edema. No lobar consolidation, effusions, or pneumothorax. Advanced degenerative   changes in both shoulders, left greater than right.   CT Head w/o Contrast    Narrative    EXAM: CT HEAD W/O CONTRAST, CT CERVICAL SPINE W/O CONTRAST  LOCATION: [facility]  DATE: 4/23/2025    INDICATION: Fall. Increasing weakness.  COMPARISON: CT brain 10/24/2020.  TECHNIQUE:   1) Routine CT Head without IV contrast. Multiplanar reformats. Dose reduction techniques were used.  2) Routine CT Cervical Spine without IV contrast. Multiplanar reformats. Dose reduction techniques were used.    FINDINGS:   HEAD CT:   INTRACRANIAL CONTENTS: No finding for  intracranial hemorrhage, mass, or acute infarct. Mild prominence of the lateral ventricles. Small amount of low-attenuation change is again seen within the periventricular white matter compatible with nondescript   gliosis. No transcortical areas of low attenuation change. No mass effect or midline shift.    Cerebellar tonsils are normally positioned. Sella is unremarkable for technique. Corpus callosum is normally formed.    VISUALIZED ORBITS/SINUSES/MASTOIDS: No intraorbital abnormality. No paranasal sinus mucosal disease. No middle ear or mastoid effusion.    BONES/SOFT TISSUES: Calvarium is intact, without fracture or suspicious lytic or blastic foci. Moderate-sized superficial soft tissue contusion/subgaleal hematoma seen along the posterior superior parietal scalp.    CERVICAL SPINE CT:   VERTEBRA: Lateral alignment is essentially normal. There is reversal of the normal cervical lordosis with grade I anterolisthesis of C3 on C4 and C4 on C5 and subtle anterolisthesis of C7 on T1. Listhesis at these levels is age-indeterminate but favored   to be chronic and degenerative in etiology given congruent appearance of associated facet degenerative change at these levels and lack of associated prevertebral soft tissue swelling.    Craniocervical junction is intact. Mild degenerative change anterior C1-C2 articulation. There is severe interspace narrowing at C5-C6 and C6-C7 with mild associated endplate irregularity and extensive endplate sclerosis and prominent ventral spurring at   these levels. Mild prominent ventral spurring C4-C5 with mild interspace narrowing. Mild interspace narrowing C3-C4 and C7-T1.    Moderate or advanced facet arthropathy is seen on the left at the C2-C3 and C3-C4 levels with moderate left sided facet arthropathy at C7-T1 and T1-T2. Moderate right-sided facet arthropathy C4-C5, C7-T1, and T1-T2.     CANAL/FORAMINA: Posterior interbody ridging contributes to mild spinal canal stenosis at  C5-C6. Moderate right foraminal stenosis C4-C5. Severe right and moderate left foraminal stenosis C5-C6, and moderate left and mild right foraminal stenosis C6-C7.    PARASPINAL: Dorsal paraspinal soft tissues are unremarkable. Visualized lung apices are clear.       Impression    IMPRESSION:  HEAD CT:  1.  Superficial soft tissue contusion/subgaleal hematoma is seen along the posterior superior parietal scalp.  2.  No finding for intracranial hemorrhage, mass, or acute infarct.  3.  Mild volume loss and presumed sequela chronic microvascular ischemic change.    CERVICAL SPINE CT:  1.  Moderate to advanced cervical spondylosis without finding for acute fracture.  2.  Grade I anterolisthesis C3 on C4, C4 on C5 and C7 on T1. Listhesis at these levels is age-indeterminate but favored to be chronic and degenerative in etiology. Correlate for cervical pain.     CT Cervical Spine w/o Contrast    Narrative    EXAM: CT HEAD W/O CONTRAST, CT CERVICAL SPINE W/O CONTRAST  LOCATION: [facility]  DATE: 4/23/2025    INDICATION: Fall. Increasing weakness.  COMPARISON: CT brain 10/24/2020.  TECHNIQUE:   1) Routine CT Head without IV contrast. Multiplanar reformats. Dose reduction techniques were used.  2) Routine CT Cervical Spine without IV contrast. Multiplanar reformats. Dose reduction techniques were used.    FINDINGS:   HEAD CT:   INTRACRANIAL CONTENTS: No finding for intracranial hemorrhage, mass, or acute infarct. Mild prominence of the lateral ventricles. Small amount of low-attenuation change is again seen within the periventricular white matter compatible with nondescript   gliosis. No transcortical areas of low attenuation change. No mass effect or midline shift.    Cerebellar tonsils are normally positioned. Sella is unremarkable for technique. Corpus callosum is normally formed.    VISUALIZED ORBITS/SINUSES/MASTOIDS: No intraorbital abnormality. No paranasal sinus mucosal disease. No middle ear or  mastoid effusion.    BONES/SOFT TISSUES: Calvarium is intact, without fracture or suspicious lytic or blastic foci. Moderate-sized superficial soft tissue contusion/subgaleal hematoma seen along the posterior superior parietal scalp.    CERVICAL SPINE CT:   VERTEBRA: Lateral alignment is essentially normal. There is reversal of the normal cervical lordosis with grade I anterolisthesis of C3 on C4 and C4 on C5 and subtle anterolisthesis of C7 on T1. Listhesis at these levels is age-indeterminate but favored   to be chronic and degenerative in etiology given congruent appearance of associated facet degenerative change at these levels and lack of associated prevertebral soft tissue swelling.    Craniocervical junction is intact. Mild degenerative change anterior C1-C2 articulation. There is severe interspace narrowing at C5-C6 and C6-C7 with mild associated endplate irregularity and extensive endplate sclerosis and prominent ventral spurring at   these levels. Mild prominent ventral spurring C4-C5 with mild interspace narrowing. Mild interspace narrowing C3-C4 and C7-T1.    Moderate or advanced facet arthropathy is seen on the left at the C2-C3 and C3-C4 levels with moderate left sided facet arthropathy at C7-T1 and T1-T2. Moderate right-sided facet arthropathy C4-C5, C7-T1, and T1-T2.     CANAL/FORAMINA: Posterior interbody ridging contributes to mild spinal canal stenosis at C5-C6. Moderate right foraminal stenosis C4-C5. Severe right and moderate left foraminal stenosis C5-C6, and moderate left and mild right foraminal stenosis C6-C7.    PARASPINAL: Dorsal paraspinal soft tissues are unremarkable. Visualized lung apices are clear.       Impression    IMPRESSION:  HEAD CT:  1.  Superficial soft tissue contusion/subgaleal hematoma is seen along the posterior superior parietal scalp.  2.  No finding for intracranial hemorrhage, mass, or acute infarct.  3.  Mild volume loss and presumed sequela chronic microvascular  ischemic change.    CERVICAL SPINE CT:  1.  Moderate to advanced cervical spondylosis without finding for acute fracture.  2.  Grade I anterolisthesis C3 on C4, C4 on C5 and C7 on T1. Listhesis at these levels is age-indeterminate but favored to be chronic and degenerative in etiology. Correlate for cervical pain.     Echocardiogram Complete     Value    LVEF  60-65%    Narrative    [id number]  [id number]  [id number]  [id number]^ALEC^GEORGIA^ROYCE     [facility]  Echocardiography Laboratory  [address] [place] [address]     Name: JOHNNIE MORRIS  MRN: [id number]  : 1955  Study Date: 2025 01:55 PM  Age: 70 yrs  Gender: Female  Patient Location: [facility]  Reason For Study: CHF  Ordering Physician: GEORGIA MICHAEL  Referring Physician: Nicol Ref-Primary, Physician  Performed By: Soila Almonte RDCS     BSA: 2.3 m2  Height: 68 in  Weight: 264 lb  HR: 82  BP: 152/71 mmHg  ______________________________________________________________________________  Procedure  Echocardiogram with two-dimensional, color and spectral Doppler. Optison (NDC  #[id number]) given intravenously. Adequate quality two-dimensional was  performed and interpreted.  ______________________________________________________________________________  Interpretation Summary     1. Normal left ventricular size and systolic function. Estimated ejection  fraction is 60 to 65%.  2. Normal right ventricular size and systolic function.  3. Mild left atrial enlargement.  4. Mild calcific aortic valve stenosis. Aortic valve mean systolic gradient is  12 mmHg. Aortic valve area by Doppler is 1.7 cmÂ .  5. No prior exam available for comparison.  ______________________________________________________________________________  Left Ventricle  The left ventricle is normal in size. There is normal left ventricular wall  thickness. Left ventricular systolic function is normal. Left ventricular  diastolic function is indeterminate. The  visual ejection fraction is 60-65%.  No regional wall motion abnormalities noted.     Right Ventricle  The right ventricle is normal in size and function.     Atria  The left atrium is mildly dilated. Right atrium not well visualized. There is  no color Doppler evidence of an atrial shunt.     Mitral Valve  The mitral valve leaflets are mildly thickened. There is trace mitral  regurgitation.     Tricuspid Valve  The tricuspid valve is not well visualized. There is trace tricuspid  regurgitation.     Aortic Valve  The aortic valve is not well visualized. No aortic regurgitation is present.  Mild calcific aortic valve stenosis. Aortic valve mean systolic gradient is 12  mmHg. Aortic valve area by Doppler is 1.7 cmÂ .     Pulmonic Valve  The pulmonic valve is not well visualized. There is no pulmonic valvular  regurgitation.     Vessels  The aortic root is normal size. Ascending aorta not well visualized. The  inferior vena cava was normal in size with preserved respiratory variability.     Pericardium  There is no pericardial effusion.     Rhythm  Sinus rhythm was noted.  ______________________________________________________________________________  MMode/2D Measurements & Calculations  IVSd: 1.2 cm     LVIDd: 4.8 cm  LVIDs: 3.0 cm  LVPWd: 1.1 cm  IVC diam: 1.5 cm  FS: 36.2 %  LV mass(C)d: 204.9 grams  LV mass(C)dI: 89.1 grams/m2  Ao root diam: 3.0 cm  LA dimension: 4.1 cm  LA/Ao: 1.4  LVOT diam: 2.0 cm  LVOT area: 3.3 cm2  Ao root diam index Ht(cm/m): 1.8  Ao root diam index BSA (cm/m2): 1.3  LA Volume (BP): 80.0 ml  LA Volume Index (BP): 34.8 ml/m2  RV Base: 3.5 cm     RWT: 0.47  TAPSE: 1.8 cm     Doppler Measurements & Calculations  MV E max charles: 81.8 cm/sec  MV A max charles: 99.8 cm/sec  MV E/A: 0.82  MV dec slope: 543.3 cm/sec2  MV dec time: 0.15 sec  Ao V2 max: 239.7 cm/sec  Ao max P.0 mmHg  Ao V2 mean: 164.7 cm/sec  Ao mean P.3 mmHg  Ao V2 VTI: 48.1 cm  EDUARDO(I,D): 1.7 cm2  EDUARDO(V,D): 1.8 cm2  LV V1 max  P.9 mmHg  LV V1 max: 131.4 cm/sec  LV V1 VTI: 25.7 cm  SV(LVOT): 84.0 ml  SI(LVOT): 36.5 ml/m2  AV Jean Marie Ratio (DI): 0.55  EDUARDO Index (cm2/m2): 0.76  E/E' avg: 10.4  Lateral E/e': 9.6  Medial E/e': 11.2  RV S Jean Marie: 12.1 cm/sec     ______________________________________________________________________________  Report approved by: Hayley Harmon MD on 2025 02:33 PM             Transthoracic Echocardiogram Results:  No results found for this or any previous visit (from the past 4320 hours).             Pending Results:     Unresulted Labs Ordered in the Past 30 Days of this Admission       Date and Time Order Name Status Description    2025  4:46 PM Blood Culture Peripheral Blood Preliminary     2025  4:46 PM Blood Culture Hand, Right Preliminary                         Discharge Instructions and Follow-Up:     Discharge instructions and follow-up:   Discharge Procedure Orders   General info for SNF   Order Comments: Length of Stay Estimate: Short Term Care: Estimated # of Days <30  Condition at Discharge: Stable  Level of care:skilled   Rehabilitation Potential: Fair  Admission H&P remains valid and up-to-date: Yes  Recent Chemotherapy: N/A  Use Nursing Home Standing Orders: Yes     Mantoux instructions   Order Comments: Give two-step Mantoux (PPD) Per Facility Policy Yes     Follow Up and recommended labs and tests   Order Comments: Follow up with Nursing home physician.  Will need to have Ziopatch ordered, we cannot get this set up over the weekend.     Reason for your hospital stay   Order Comments: UTI.     Activity - Up ad chandra     Order Specific Question Answer Comments   Is discharge order? Yes      Full Code     Order Specific Question Answer Comments   Code status determined by: Discussion with patient/ legal decision maker      Occupational Therapy Adult Consult   Order Comments: Evaluate and treat as clinically indicated.    Reason:  weak     Physical Therapy Adult Consult   Order Comments:  Evaluate and treat as clinically indicated.    Reason:  weak     Fall precautions     Diet   Order Comments: Follow this diet upon discharge: Current Diet:Orders Placed This Encounter      Snacks/Supplements Adult: Ensure Enlive; With Meals      Combination Diet Regular Diet Adult     Order Specific Question Answer Comments   Is discharge order? Yes              Hospital Course:     iG Zhao is a 70 year old female with a history of osteoarthritis admitted on 4/23/2025 with a fall and generalized weakness with workup notable for urinary tract infection, rapid atrial fibrillation, and hypothyroidism.     Problem List:   Mechanical fall and generalized weakness: Her UTI may be contributing.  Will treat her UTI and manage her atrial fibrillation and hypothyroidism.  PT consulted, planning to discharge to TCU.  She lives with her son who helps her with her ADLs.   Sepsis secondary to urinary tract infection: Technically meets criteria for sepsis with tachycardia, leukocytosis, and lactic acidosis.  Urine culture with Klebsiella and Aerococcus.  Was on ceftriaxone, transition to oral Cipro today.  Rapid atrial fibrillation: Heart rate is now controlled and back into a normal sinus rhythm.  Possibly driven by her hypothyroidism and UTI.  Will hold off on AV norman blocking agents for now.  [id number] is elevated and she meets criteria for full anticoagulation so we initiated Eliquis.  Risks of bleeding were discussed with the patient and her son who agree with initiating anticoagulation.  Echocardiogram unremarkable.  Planning Zio patch but will discharge to TCU and it is the weekend today, so will need to be arranged as outpatient.  Overt hypothyroidism: Patient is a high TSH and a low free T4.  Started on levothyroxine 25 mcg daily.  Recheck TFTs in 6-8 weeks.    The patient was seen, examined, and counseled on this day. The hospitalization and plan of care was reviewed with the patient extensively. All  questions were addressed and the patient agreed to follow-up as noted above.      Total time spent in face to face contact with the patient and coordinating discharge was:  35 Minutes    Will Alves DO, MPH  [facility] Hospitalist  [address] [place] [place] [address]  04/26/2025

## 2025-04-26 NOTE — PLAN OF CARE
"VSS. Tele-SR. No reports of pain/SOB. A/Ox4. LS-clear. PIV removed for discharge. Continent of bowel and bladder. Large BM today. Mepilex located on buttocks. Assist of 2 w/ johanna steady. Regular diet w/ good appetite. OT, PT following. Discharge today to [facility].    Goal Outcome Evaluation:      Plan of Care Reviewed With: patient    Overall Patient Progress: improvingOverall Patient Progress: improving    Outcome Evaluation: Plan for discharge      Problem: Adult Inpatient Plan of Care  Goal: Plan of Care Review  Description: The Plan of Care Review/Shift note should be completed every shift.  The Outcome Evaluation is a brief statement about your assessment that the patient is improving, declining, or no change.  This information will be displayed automatically on your shiftnote.  Outcome: Met  Flowsheets (Taken 4/26/2025 1154)  Outcome Evaluation: Plan for discharge  Plan of Care Reviewed With: patient  Overall Patient Progress: improving  Goal: Patient-Specific Goal (Individualized)  Description: You can add care plan individualizations to a care plan. Examples of Individualization might be:  \"Parent requests to be called daily at 9am for status\", \"I have a hard time hearing out of my right ear\", or \"Do not touch me to wake me up as it startlesme\".  Outcome: Met  Goal: Absence of Hospital-Acquired Illness or Injury  Outcome: Met  Intervention: Identify and Manage Fall Risk  Recent Flowsheet Documentation  Taken 4/26/2025 0921 by Sissy Khoury RN  Safety Promotion/Fall Prevention:   activity supervised   safety round/check completed  Intervention: Prevent and Manage VTE (Venous Thromboembolism) Risk  Recent Flowsheet Documentation  Taken 4/26/2025 0921 by Sissy Khoury RN  VTE Prevention/Management:   SCDs off (sequential compression devices)   patient refused intervention  Intervention: Prevent Infection  Recent Flowsheet Documentation  Taken 4/26/2025 0921 by Sissy Khoury RN  Infection " Prevention:   cohorting utilized   rest/sleep promoted  Goal: Optimal Comfort and Wellbeing  Outcome: Met  Intervention: Provide Person-Centered Care  Recent Flowsheet Documentation  Taken 4/26/2025 0921 by Sissy Khoury RN  Trust Relationship/Rapport:   care explained   choices provided   thoughts/feelings acknowledged  Goal: Readiness for Transition of Care  Outcome: Met     Problem: Delirium  Goal: Optimal Coping  Outcome: Met  Intervention: Optimize Psychosocial Adjustment to Delirium  Recent Flowsheet Documentation  Taken 4/26/2025 0921 by Sissy Khoury RN  Family/Support System Care: self-care encouraged  Goal: Improved Behavioral Control  Outcome: Met  Intervention: Minimize Safety Risk  Recent Flowsheet Documentation  Taken 4/26/2025 0921 by Sissy Khoury RN  Enhanced Safety Measures: patient/family teach back on injury risk  Trust Relationship/Rapport:   care explained   choices provided   thoughts/feelings acknowledged  Goal: Improved Attention and Thought Clarity  Outcome: Met  Goal: Improved Sleep  Outcome: Met     Problem: Skin Injury Risk Increased  Goal: Skin Health and Integrity  Outcome: Met  Intervention: Plan: Nurse Driven Intervention: Moisture Management  Recent Flowsheet Documentation  Taken 4/26/2025 0921 by Sissy Khoury RN  Moisture Interventions: Encourage regular toileting  Intervention: Plan: Nurse Driven Intervention: Friction and Shear  Recent Flowsheet Documentation  Taken 4/26/2025 0921 by Sissy Khoury RN  Friction/Shear Interventions: HOB 30 degrees or less  Intervention: Optimize Skin Protection  Recent Flowsheet Documentation  Taken 4/26/2025 1152 by Sissy Khoury RN  Activity Management: up to bedside commode     Problem: Dysrhythmia  Goal: Normalized Cardiac Rhythm  Outcome: Met  Intervention: Monitor and Manage Cardiac Rhythm Effect  Recent Flowsheet Documentation  Taken 4/26/2025 0921 by Sissy Khoury RN  VTE Prevention/Management:   SCDs off  (sequential compression devices)   patient refused intervention     Problem: Fall Injury Risk  Goal: Absence of Fall and Fall-Related Injury  Outcome: Met  Intervention: Identify and Manage Contributors  Recent Flowsheet Documentation  Taken 4/26/2025 0921 by Sissy Khoury, RN  Medication Review/Management: medications reviewed  Intervention: Promote Injury-Free Environment  Recent Flowsheet Documentation  Taken 4/26/2025 0921 by Sissy Khoury, RN  Safety Promotion/Fall Prevention:   activity supervised   safety round/check completed     Problem: Fatigue  Goal: Improved Activity Tolerance  Outcome: Met  Intervention: Promote Improved Energy  Recent Flowsheet Documentation  Taken 4/26/2025 1152 by Sissy Khoury, RN  Activity Management: up to bedside commode

## 2025-04-26 NOTE — PROGRESS NOTES
[facility]    Hospitalist Progress Note  Name: Gi Zhao    MRN: [id number]  Provider:  Will Alves DO, MPH  Date of Service: 04/26/2025    Summary of Stay: Gi Zhao is a 70 year old female with a history of osteoarthritis admitted on 4/23/2025 with a fall and generalized weakness with workup notable for urinary tract infection, rapid atrial fibrillation, and hypothyroidism.    Problem List:   Mechanical fall and generalized weakness: Her UTI may be contributing.  Will treat her UTI and manage her atrial fibrillation and hypothyroidism.  PT consulted, planning to discharge to TCU.  She lives with her son who helps her with her ADLs.   Sepsis secondary to urinary tract infection: Technically meets criteria for sepsis with tachycardia, leukocytosis, and lactic acidosis.  Urine culture with Klebsiella and Aerococcus.  Was on ceftriaxone, transition to oral Cipro today.  Rapid atrial fibrillation: Heart rate is now controlled and back into a normal sinus rhythm.  Possibly driven by her hypothyroidism and UTI.  Will hold off on AV norman blocking agents for now.  [id number] is elevated and she meets criteria for full anticoagulation so we initiated Eliquis.  Risks of bleeding were discussed with the patient and her son who agree with initiating anticoagulation.  Echocardiogram unremarkable.  Planning Zio patch on discharge.  Overt hypothyroidism: Patient is a high TSH and a low free T4.  Started on levothyroxine 25 mcg daily.    DVT Prophylaxis: DOAC  Code Status: Full Code  Diet: Combination Diet Regular Diet Adult  Snacks/Supplements Adult: Ensure Enlive; With Meals    Ryan Catheter: Not present  Disposition: Expected discharge in 1 days to TCU. Goals prior to discharge include placement. Medically ready for discharge.  Incidental Findings: None.  Family updated today: No.    40 MINUTES SPENT BY ME on the date of service doing chart review, history, exam, documentation & further  activities per the note.      Interval History   The patient reports doing well. No chest pain or shortness of breath. No nausea, vomiting, diarrhea, constipation. No fevers. No other specific complaints identified.     -Data reviewed today: I personally reviewed all new labs and imaging results over the last 24 hours.     Physical Exam   Temp: 97.5  F (36.4  C) Temp src: Oral BP: 120/57 Pulse: 76   Resp: 18 SpO2: 98 % O2 Device: None (Room air)    Vitals:    04/23/25 1540 04/23/25 2100   Weight: 119.2 kg (262 lb 12.6 oz) 120.1 kg (264 lb 12.4 oz)     Vital Signs with Ranges  Temp:  [97.5  F (36.4  C)-97.8  F (36.6  C)] 97.5  F (36.4  C)  Pulse:  [76-80] 76  Resp:  [18] 18  BP: (120-127)/(54-80) 120/57  SpO2:  [94 %-99 %] 98 %  No intake/output data recorded.    GENERAL: No apparent distress. Awake, alert, and fully oriented.  HEENT: Normocephalic, atraumatic. Extraocular movements intact.  CARDIOVASCULAR: Regular rate and rhythm without murmurs or rubs. No S3.  PULMONARY: Clear bilaterally.  GASTROINTESTINAL: Soft, non-tender, non-distended. Bowel sounds normoactive.   EXTREMITIES: No cyanosis or clubbing. No edema.  NEUROLOGICAL: CN 2-12 grossly intact, no focal neurological deficits.  DERMATOLOGICAL: No rash, ulcer, bruising, nor jaundice.     Medications   Current Facility-Administered Medications   Medication Dose Route Frequency Provider Last Rate Last Admin     Current Facility-Administered Medications   Medication Dose Route Frequency Provider Last Rate Last Admin    apixaban ANTICOAGULANT (ELIQUIS) tablet 5 mg  5 mg Oral BID Will Alves DO   5 mg at 04/26/25 0915    artificial saliva (BIOTENE MT) solution 1 spray  1 spray Mouth/Throat 4x Daily Raul Shepard MD   1 spray at 04/25/25 1956    ciprofloxacin (CIPRO) tablet 500 mg  500 mg Oral Q12H [facility] (08/20) Will Alves DO   500 mg at 04/26/25 0915    levothyroxine (SYNTHROID/LEVOTHROID) tablet 25 mcg  25 mcg Oral QAM AC Raul Shepard MD    "25 mcg at 04/26/25 0915    sodium chloride (PF) 0.9% PF flush 3 mL  3 mL Intracatheter Q8H [facility] Raul Shepard MD   3 mL at 04/26/25 0036     Data     Laboratory:  Recent Labs   Lab 04/24/25  0709 04/23/25  1553   WBC 7.9 12.6*   HGB 11.4* 12.4   HCT 34.6* 37.3   MCV 93 94    172     Recent Labs   Lab 04/26/25  0627 04/25/25  0719 04/24/25  0709 04/23/25  1553   NA  --   --  141 141   POTASSIUM 3.9 3.6 4.0 4.3   CHLORIDE  --   --  108* 107   CO2  --   --  20* 20*   ANIONGAP  --   --  13 14   GLC  --   --  99 135*   BUN  --   --  16.3 16.6   CR  --   --  1.02* 1.17*   GFRESTIMATED  --   --  59* 50*   MARTHA  --   --  9.2 9.3     No results for input(s): \"CULT\" in the last 168 hours.    Imaging:  No results found for this or any previous visit (from the past 24 hours).        Will Alves DO MPH  [facility] Hospitalist  [address] [place] [address]  04/26/2025   "

## 2025-04-26 NOTE — PLAN OF CARE
"Physical Therapy Discharge Summary    Reason for therapy discharge:    Discharged to transitional care facility.    Progress towards therapy goal(s). See goals on Care Plan in [facility] electronic health record for goal details.  Goals not met.  Barriers to achieving goals:   discharge from facility.    Therapy recommendation(s):    Continued therapy is recommended.  Rationale/Recommendations:  Per prior PT recommendation, \"Pt is currently below reported baseline. At baseline pt amb w/ FWW and receives assistance from son as needed, pt reports being mostly bed bound as of late. Currently pt requires max A x2 w/ SS for transfers. Requires mod A for bed mobility, overall limited by weakness, impaired ROM and balance. Pt is unable to demonstrate mobility for home. Recommend TCU to improve above impairments to progress IND functional mobility.\".      "

## 2025-04-26 NOTE — PLAN OF CARE
"VS stable. Complained of pain to right leg and pain med's give. Tele is sinus rhythm. [place].    Goal Outcome Evaluation:      Plan of Care Reviewed With: patient    Overall Patient Progress: no changeOverall Patient Progress: no change    Outcome Evaluation: VS stable.      Problem: Adult Inpatient Plan of Care  Goal: Plan of Care Review  Description: The Plan of Care Review/Shift note should be completed every shift.  The Outcome Evaluation is a brief statement about your assessment that the patient is improving, declining, or no change.  This information will be displayed automatically on your shiftnote.  Outcome: Progressing  Flowsheets (Taken 4/26/2025 0317)  Outcome Evaluation: VS stable.  Plan of Care Reviewed With: patient  Overall Patient Progress: no change  Goal: Patient-Specific Goal (Individualized)  Description: You can add care plan individualizations to a care plan. Examples of Individualization might be:  \"Parent requests to be called daily at 9am for status\", \"I have a hard time hearing out of my right ear\", or \"Do not touch me to wake me up as it startlesme\".  Outcome: Progressing  Goal: Absence of Hospital-Acquired Illness or Injury  Outcome: Progressing  Intervention: Identify and Manage Fall Risk  Recent Flowsheet Documentation  Taken 4/25/2025 2000 by Bety Hardin RN  Safety Promotion/Fall Prevention:   clutter free environment maintained   lighting adjusted   nonskid shoes/slippers when out of bed  Intervention: Prevent Skin Injury  Recent Flowsheet Documentation  Taken 4/25/2025 2000 by Bety Hardin RN  Body Position: supine, head elevated  Intervention: Prevent and Manage VTE (Venous Thromboembolism) Risk  Recent Flowsheet Documentation  Taken 4/25/2025 2000 by Bety Hardin RN  VTE Prevention/Management: SCDs off (sequential compression devices)  Intervention: Prevent Infection  Recent Flowsheet Documentation  Taken 4/25/2025 2000 by Bety Hardin RN  Infection Prevention:   " single patient room provided   hand hygiene promoted  Goal: Optimal Comfort and Wellbeing  Outcome: Progressing  Intervention: Provide Person-Centered Care  Recent Flowsheet Documentation  Taken 4/25/2025 2000 by Bety Hardin RN  Trust Relationship/Rapport:   care explained   choices provided   questions answered   reassurance provided  Goal: Readiness for Transition of Care  Outcome: Progressing     Problem: Delirium  Goal: Optimal Coping  Outcome: Progressing  Goal: Improved Behavioral Control  Outcome: Progressing  Intervention: Minimize Safety Risk  Recent Flowsheet Documentation  Taken 4/25/2025 2000 by Bety Hardin RN  Enhanced Safety Measures:   pain management   room near unit station  Trust Relationship/Rapport:   care explained   choices provided   questions answered   reassurance provided  Goal: Improved Attention and Thought Clarity  Outcome: Progressing  Intervention: Maximize Cognitive Function  Recent Flowsheet Documentation  Taken 4/25/2025 2000 by Bety Hardin RN  Sensory Stimulation Regulation:   care clustered   lighting decreased  Reorientation Measures: clock in view  Goal: Improved Sleep  Outcome: Progressing     Problem: Skin Injury Risk Increased  Goal: Skin Health and Integrity  Outcome: Progressing  Intervention: Plan: Nurse Driven Intervention: Moisture Management  Recent Flowsheet Documentation  Taken 4/25/2025 2000 by Bety Hardin RN  Moisture Interventions:   Encourage regular toileting   Body-worn absorptive product when OOB (brief, etc.)  Intervention: Plan: Nurse Driven Intervention: Friction and Shear  Recent Flowsheet Documentation  Taken 4/25/2025 2000 by Bety Hardin RN  Friction/Shear Interventions: HOB 30 degrees or less  Intervention: Optimize Skin Protection  Recent Flowsheet Documentation  Taken 4/25/2025 2000 by Bety Hardin RN  Activity Management: activity adjusted per tolerance  Head of Bed (HOB) Positioning: HOB at 30-45 degrees     Problem:  Dysrhythmia  Goal: Normalized Cardiac Rhythm  Outcome: Progressing  Intervention: Monitor and Manage Cardiac Rhythm Effect  Recent Flowsheet Documentation  Taken 4/25/2025 2000 by Bety Hardin, RN  VTE Prevention/Management: SCDs off (sequential compression devices)     Problem: Fall Injury Risk  Goal: Absence of Fall and Fall-Related Injury  Outcome: Progressing  Intervention: Identify and Manage Contributors  Recent Flowsheet Documentation  Taken 4/25/2025 2000 by Bety Hardin, RN  Medication Review/Management: medications reviewed  Intervention: Promote Injury-Free Environment  Recent Flowsheet Documentation  Taken 4/25/2025 2000 by Bety Hardin, RN  Safety Promotion/Fall Prevention:   clutter free environment maintained   lighting adjusted   nonskid shoes/slippers when out of bed     Problem: Fatigue  Goal: Improved Activity Tolerance  Outcome: Progressing  Intervention: Promote Improved Energy  Recent Flowsheet Documentation  Taken 4/25/2025 2000 by Bety Hardin, RN  Activity Management: activity adjusted per tolerance

## 2025-04-26 NOTE — PROGRESS NOTES
Care Management Discharge Note    Discharge Date: 04/26/2025       Discharge Disposition: Transitional Care    Discharge Services:  transportation    Discharge DME:  none    Discharge Transportation: [facility] wheelchair transport [id number]    Private pay costs discussed: transportation costs    PAS Confirmation Code: [id number]  Patient/family educated on Medicare website which has current facility and service quality ratings: yes    Education Provided on the Discharge Plan:  yes  Persons Notified of Discharge Plans: TCU  Patient/Family in Agreement with the Plan: yes    Handoff Referral Completed: No, handoff not indicated or clinically appropriate    Additional Information:  CM following for discharge plan to Transitional Care Unit. Patient has been accepted by [facility] today. Transportation has already been arranged for  window [phone]. Orders are in place for discharge and were faxed to the [facility] to fax: [phone]. Call placed to [facility] [phone] and updated the staff on patients discharge today, faxed orders and transport time. They are agreeable to the plan and do not have questions. No other CM needs.           Sweta Dumont RN BSN CM  Inpatient Care Coordination  [facility]  [phone]

## 2025-04-27 ENCOUNTER — LAB REQUISITION (OUTPATIENT)
Dept: LAB | Facility: CLINIC | Age: 70
End: 2025-04-27
Payer: COMMERCIAL

## 2025-04-27 DIAGNOSIS — Z11.1 ENCOUNTER FOR SCREENING FOR RESPIRATORY TUBERCULOSIS: ICD-10-CM

## 2025-04-27 NOTE — PLAN OF CARE
"Occupational Therapy Discharge Summary    Reason for therapy discharge:    Discharged to transitional care facility.    Progress towards therapy goal(s). See goals on Care Plan in [facility] electronic health record for goal details.  Goals not met.  Barriers to achieving goals:   discharge from facility.    Therapy recommendation(s):    Continued therapy is recommended.  Rationale/Recommendations:  per evaluating OT \"Patient presents significantly below baseline with ADLs and mobility requiring max assist and SS for transfers and max assist for ADLS. Pt would benefit from continued OT in TCU to return to prior level of function\".    **This patient was not seen by writing OT, information for discharge summary taken from treating OT's notes.**    "

## 2025-04-28 ENCOUNTER — DOCUMENTATION ONLY (OUTPATIENT)
Dept: GERIATRICS | Facility: CLINIC | Age: 70
End: 2025-04-28

## 2025-04-28 ENCOUNTER — TRANSITIONAL CARE UNIT VISIT (OUTPATIENT)
Dept: GERIATRICS | Facility: CLINIC | Age: 70
End: 2025-04-28
Payer: COMMERCIAL

## 2025-04-28 ENCOUNTER — LAB REQUISITION (OUTPATIENT)
Dept: LAB | Facility: CLINIC | Age: 70
End: 2025-04-28
Payer: COMMERCIAL

## 2025-04-28 ENCOUNTER — PATIENT OUTREACH (OUTPATIENT)
Dept: CARE COORDINATION | Facility: CLINIC | Age: 70
End: 2025-04-28

## 2025-04-28 VITALS
HEIGHT: 68 IN | HEART RATE: 86 BPM | SYSTOLIC BLOOD PRESSURE: 153 MMHG | BODY MASS INDEX: 40.01 KG/M2 | RESPIRATION RATE: 18 BRPM | DIASTOLIC BLOOD PRESSURE: 83 MMHG | TEMPERATURE: 98.1 F | OXYGEN SATURATION: 99 % | WEIGHT: 264 LBS

## 2025-04-28 DIAGNOSIS — Z87.440 PERSONAL HISTORY OF URINARY TRACT INFECTION: Primary | ICD-10-CM

## 2025-04-28 DIAGNOSIS — M62.81 GENERALIZED MUSCLE WEAKNESS: ICD-10-CM

## 2025-04-28 DIAGNOSIS — A41.9 SEPSIS, DUE TO UNSPECIFIED ORGANISM, UNSPECIFIED WHETHER ACUTE ORGAN DYSFUNCTION PRESENT (H): ICD-10-CM

## 2025-04-28 DIAGNOSIS — E03.9 HYPOTHYROIDISM, UNSPECIFIED TYPE: ICD-10-CM

## 2025-04-28 DIAGNOSIS — E03.9 HYPOTHYROIDISM, UNSPECIFIED: ICD-10-CM

## 2025-04-28 DIAGNOSIS — I48.91 ATRIAL FIBRILLATION WITH RAPID VENTRICULAR RESPONSE (H): ICD-10-CM

## 2025-04-28 LAB
BACTERIA BLD CULT: NO GROWTH
BACTERIA BLD CULT: NO GROWTH

## 2025-04-28 PROCEDURE — 86481 TB AG RESPONSE T-CELL SUSP: CPT | Mod: ORL | Performed by: PHYSICIAN ASSISTANT

## 2025-04-28 PROCEDURE — 99309 SBSQ NF CARE MODERATE MDM 30: CPT | Performed by: PHYSICIAN ASSISTANT

## 2025-04-28 PROCEDURE — P9604 ONE-WAY ALLOW PRORATED TRIP: HCPCS | Mod: ORL | Performed by: PHYSICIAN ASSISTANT

## 2025-04-28 PROCEDURE — 36415 COLL VENOUS BLD VENIPUNCTURE: CPT | Mod: ORL | Performed by: PHYSICIAN ASSISTANT

## 2025-04-28 NOTE — PROGRESS NOTES
[facility]    Background: Transitional Care Management program identified per system criteria and reviewed by [facility] team for possible outreach.    Assessment: Upon chart review, CCRC Team member will not proceed with patient outreach related to this episode of Transitional Care Management program due to reason below:    Non-[facility] TCU: CCRC team member noted patient discharged to TCU/ARU/LTACH. Patient is not established with a [facility] Primary [facility] Clinic currently supported by Primary Care-Care Coordination therefore handoff to Primary Care-Care Coordination is not appropriate at this time.    Plan: Transitional Care Management episode addressed appropriately per reason noted above.          KEN Beavers  [phone]  [facility]

## 2025-04-28 NOTE — LETTER
4/28/2025      Gi Zhao  [address]  [place] [address]        [facility] GERIATRICS    PRIMARY CARE PROVIDER AND CLINIC:  Physician No Ref-Primary, No address on file  Chief Complaint   Patient presents with     Hospital F/U      [place] Medical Record Number:  [id number]  Place of Service where encounter took place:  [facility] [[id number]]    Gi Zhao  is a 70 year old  (1955), admitted to the above facility from  [facility]. Hospital stay 4/23/25 through 4/26/25..   HPI:    Notes from hospitalization reviewed including H&P and D/c summary    Gi Zhao is a 70-year-old female with a past medical history of osteoarthritis who was recently hospitalized at [facility].  Presented for evaluation of generalized weakness.  Found to have UTI as well as atrial fibrillation with RVR.  Additionally, new diagnosis of hypothyroidism.  Urinary tract infection treated with antibiotics.  Converted out of atrial fibrillation and did not require further rate reducing medications.  Was initiated on apixaban at discharge.  Initiated on levothyroxine for suspected new diagnosis of hypothyroidism.  Deconditioned compared to baseline so discharged to U    Gi is evaluated in her room. Son joins visit as well. Still feels weak.  Has had progressive weakness over the past several months.  Also notes decreased appetite, depressive symptoms and fatigue.  Discussed diagnosis of hypothyroidism.  Does not currently have a PCP so we will need to reestablish.  Lives in [place] far from [facility].  Discussed need for Zio patch and son will bring in after it arrives.  Denies constipation.  No abdominal pain.    Review of nursing home EMR: -[id number]    CODE STATUS/ADVANCE DIRECTIVES DISCUSSION:  Full Code  CPR/Full code   ALLERGIES:   Allergies   Allergen Reactions     Oxycontin [Oxycodone] GI Disturbance      PAST MEDICAL HISTORY:  "  Past Medical History:   Diagnosis Date     Pain in both knees       PAST SURGICAL HISTORY:   has a past surgical history that includes Mandible surgery; Arthroplasty knee bilateral (Bilateral, 4/6/2015); Repair Retinacular Open Medial or Lateral (Bilateral, 4/21/2015); Closed reduction lower extremity (Right, 6/19/2015); Repair tendon quadriceps (Right, 6/24/2015); and Repair tendon quadriceps (Right, 11/9/2015).  FAMILY HISTORY: family history is not on file.  SOCIAL HISTORY:   reports that she has never smoked. She has never used smokeless tobacco. She reports that she does not drink alcohol and does not use drugs.  Patient's living condition: Lives with son    Post Discharge Medication Reconciliation Status:   MED REC REQUIRED  Post Medication Reconciliation Status: discharge medications reconciled and changed, per note/orders       Current Outpatient Medications   Medication Sig Dispense Refill     acetaminophen (TYLENOL 8 HOUR ARTHRITIS PAIN) 650 MG CR tablet Take 1,300 mg by mouth every 8 hours as needed for mild pain or fever.       apixaban ANTICOAGULANT (ELIQUIS) 5 MG tablet Take 1 tablet (5 mg) by mouth 2 times daily.       ciprofloxacin (CIPRO) 500 MG tablet Take 1 tablet (500 mg) by mouth every 12 hours for 3 days.       levothyroxine (SYNTHROID/LEVOTHROID) 25 MCG tablet Take 1 tablet (25 mcg) by mouth every morning (before breakfast).       No current facility-administered medications for this visit.       ROS:  10 point ROS of systems including Constitutional, Eyes, Respiratory, Cardiovascular, Gastroenterology, Genitourinary, Integumentary, Musculoskeletal, Psychiatric were all negative except for pertinent positives noted in my HPI.    Vitals:  BP (!) 153/83   Pulse 86   Temp 98.1  F (36.7  C)   Resp 18   Ht 1.727 m (5' 8\")   Wt 119.7 kg (264 lb)   SpO2 99%   BMI 40.14 kg/m    Exam:  Physical Exam  Vitals (Facility EMR) reviewed.   Constitutional:       General: She is not in acute " distress.     Appearance: She is obese.   HENT:      Head: Normocephalic and atraumatic.   Eyes:      General: No scleral icterus.  Cardiovascular:      Rate and Rhythm: Normal rate and regular rhythm.   Pulmonary:      Effort: Pulmonary effort is normal.      Breath sounds: No wheezing.   Musculoskeletal:      Right lower leg: No edema.      Left lower leg: No edema.   Skin:     General: Skin is warm and dry.      Findings: No rash.   Neurological:      Mental Status: She is alert. Mental status is at baseline.   Psychiatric:         Behavior: Behavior normal.           Lab/Diagnostic data:  [facility] in [facility] reviewed by me today.  and Most Recent 3 CBC's:  Recent Labs   Lab Test 04/24/25  0709 04/23/25  1553 10/24/20  2238   WBC 7.9 12.6* 7.6   HGB 11.4* 12.4 10.3*   MCV 93 94 85    172 198     Most Recent 3 BMP's:  Recent Labs   Lab Test 04/26/25  0627 04/25/25  0719 04/24/25  0709 04/23/25  1553 04/23/25  1553 10/24/20  2238   NA  --   --  141  --  141 139   POTASSIUM 3.9 3.6 4.0   < > 4.3 4.1   CHLORIDE  --   --  108*  --  107 109   CO2  --   --  20*  --  20* 24   BUN  --   --  16.3  --  16.6 17   CR  --   --  1.02*  --  1.17* 1.06*   ANIONGAP  --   --  13  --  14 6   MARTHA  --   --  9.2  --  9.3 8.4*   GLC  --   --  99  --  135* 125*    < > = values in this interval not displayed.     Most Recent 2 LFT's:No lab results found.  7-Day Micro Results       Collected Updated Procedure Result Status      04/28/2025 0706 04/28/2025 1022 Quantiferon TB Gold Plus Grey Tube [[id number]]   Blood from Hand, Right    In process Component Value   No component results            04/28/2025 0706 04/28/2025 1022 Quantiferon TB Gold Plus Green Tube [[id number]]   Blood from Hand, Right    In process Component Value   No component results            04/28/2025 0706 04/28/2025 1022 Quantiferon TB Gold Plus Yellow Tube [[id number]]   Blood from Hand, Right    In process Component Value   No component results             04/28/2025 0706 04/28/2025 1022 Quantiferon TB Gold Plus Purple Tube [[id number]]   Blood from Hand, Right    In process Component Value   No component results            04/23/2025 1802 04/26/2025 0638 Urine Culture [[id number]]    (Abnormal)   Urine, Catheter    Final result Component Value   Culture >100,000 CFU/mL Klebsiella pneumoniae    >100,000 CFU/mL Aerococcus urinae    Aerococcus species is usually susceptible to penicillin, cephalosporins, tetracycline and vancomycin.        Susceptibility        Klebsiella pneumoniae      CHELLE      Ampicillin  Resistant  [1]       Ampicillin/ Sulbactam 4 ug/mL Susceptible      Cefazolin 2 ug/mL Susceptible      Cefepime <=0.12 ug/mL Susceptible      Ceftazidime <=0.5 ug/mL Susceptible      Ceftriaxone <=0.25 ug/mL Susceptible      Ciprofloxacin <=0.06 ug/mL Susceptible      Gentamicin <=1 ug/mL Susceptible      Levofloxacin <=0.12 ug/mL Susceptible      Nitrofurantoin 64 ug/mL Intermediate      Piperacillin/Tazobactam <=4 ug/mL Susceptible      Trimethoprim/Sulfamethoxazole <=1/19 ug/mL Susceptible                   [1]  Intrinsically Resistant                    04/23/2025 1713 04/27/2025 2016 Blood Culture Hand, Right [[id number]]   Blood from Hand, Right    Preliminary result Component Value   Culture No growth after 4 days  [P]                04/23/2025 1649 04/27/2025 2016 Blood Culture Peripheral Blood [[id number]]   Peripheral Blood    Preliminary result Component Value   Culture No growth after 4 days  [P]                      Most Recent TSH and T4:  Recent Labs   Lab Test 04/23/25  1553   TSH 24.51*   T4 0.87*     Most Recent Hemoglobin A1c:No lab results found.  Most Recent Urinalysis:  Recent Labs   Lab Test 04/23/25  1802   COLOR Light Yellow   APPEARANCE Clear   URINEGLC Negative   URINEBILI Negative   URINEKETONE Trace*   SG 1.010   UBLD Small*   URINEPH 6.5   PROTEIN 20*   NITRITE Negative   LEUKEST Large*   RBCU 14*   WBCU 33*     Most Recent  Anemia Panel:  Recent Labs   Lab Test 04/24/25  0709   WBC 7.9   HGB 11.4*   HCT 34.6*   MCV 93        Procedure  Echocardiogram with two-dimensional, color and spectral Doppler. Optison (NDC  #[id number]) given intravenously. Adequate quality two-dimensional was  performed and interpreted.  ______________________________________________________________________________  Interpretation Summary     1. Normal left ventricular size and systolic function. Estimated ejection  fraction is 60 to 65%.  2. Normal right ventricular size and systolic function.  3. Mild left atrial enlargement.  4. Mild calcific aortic valve stenosis. Aortic valve mean systolic gradient is  12 mmHg. Aortic valve area by Doppler is 1.7 cmÂ .  5. No prior exam available for comparison.  ________________________________________  ASSESSMENT/PLAN:  Urinary tract infection  Sepsis: Never had urinary symptoms.  Received ceftriaxone in the hospital and transition to oral at discharge  Ciprofloxacin through 4/29  Monitor after antibiotics are completed    Atrial fibrillation with RVR: Initiated on apixaban.  Not requiring rate controlling medications  Continue apixaban  Zio patch ordered.  Discussed with patient/family confirmed to TCU when arrives at home    Hypothyroidism: New diagnosis.  Symptomatic with increased fatigue, weakness, decreased appetite  Continue levothyroxine 25 mcg daily  Repeat thyroid function tests in 4-6 weeks  Currently does not have PCP.  Referral placed    Generalized weakness: Multifactorial due to the above.  Declining functional status over the past several months.  Treat hypothyroidism and UTI as above  Check vitamin D level and CK  PT/OT    This note was completed in part using Dragon voice recognition software. Although reviewed after completion, some word and grammatical errors may occur.        Electronically signed by:  Mariangel Carrera PA-C                    Sincerely,        Mariangel Carrera  FERNANDO    Electronically signed

## 2025-04-28 NOTE — PROGRESS NOTES
[facility] GERIATRICS    PRIMARY CARE PROVIDER AND CLINIC:  Physician No Ref-Primary, No address on file  Chief Complaint   Patient presents with    Hospital F/U      [facility] Medical Record Number:  [id number]  Place of Service where encounter took place:  [facility] ([facility]) [[id number]]    Gi Zhao  is a 70 year old  (1955), admitted to the above facility from  [facility]. Hospital stay 4/23/25 through 4/26/25..   HPI:    Notes from hospitalization reviewed including H&P and D/c summary    Gi Zhao is a 70-year-old female with a past medical history of osteoarthritis who was recently hospitalized at [facility].  Presented for evaluation of generalized weakness.  Found to have UTI as well as atrial fibrillation with RVR.  Additionally, new diagnosis of hypothyroidism.  Urinary tract infection treated with antibiotics.  Converted out of atrial fibrillation and did not require further rate reducing medications.  Was initiated on apixaban at discharge.  Initiated on levothyroxine for suspected new diagnosis of hypothyroidism.  Deconditioned compared to baseline so discharged to U    Gi is evaluated in her room. Son joins visit as well. Still feels weak.  Has had progressive weakness over the past several months.  Also notes decreased appetite, depressive symptoms and fatigue.  Discussed diagnosis of hypothyroidism.  Does not currently have a PCP so we will need to reestablish.  Lives in [place] not far from [facility] clinic.  Discussed need for Zio patch and son will bring in after it arrives.  Denies constipation.  No abdominal pain.    Review of nursing home EMR: -154    CODE STATUS/ADVANCE DIRECTIVES DISCUSSION:  Full Code  CPR/Full code   ALLERGIES:   Allergies   Allergen Reactions    Oxycontin [Oxycodone] GI Disturbance      PAST MEDICAL HISTORY:   Past Medical History:   Diagnosis Date    Pain in both knees       PAST  "SURGICAL HISTORY:   has a past surgical history that includes Mandible surgery; Arthroplasty knee bilateral (Bilateral, 4/6/2015); Repair Retinacular Open Medial or Lateral (Bilateral, 4/21/2015); Closed reduction lower extremity (Right, 6/19/2015); Repair tendon quadriceps (Right, 6/24/2015); and Repair tendon quadriceps (Right, 11/9/2015).  FAMILY HISTORY: family history is not on file.  SOCIAL HISTORY:   reports that she has never smoked. She has never used smokeless tobacco. She reports that she does not drink alcohol and does not use drugs.  Patient's living condition: Lives with son    Post Discharge Medication Reconciliation Status:   MED REC REQUIRED  Post Medication Reconciliation Status: discharge medications reconciled and changed, per note/orders       Current Outpatient Medications   Medication Sig Dispense Refill    acetaminophen (TYLENOL 8 HOUR ARTHRITIS PAIN) 650 MG CR tablet Take 1,300 mg by mouth every 8 hours as needed for mild pain or fever.      apixaban ANTICOAGULANT (ELIQUIS) 5 MG tablet Take 1 tablet (5 mg) by mouth 2 times daily.      ciprofloxacin (CIPRO) 500 MG tablet Take 1 tablet (500 mg) by mouth every 12 hours for 3 days.      levothyroxine (SYNTHROID/LEVOTHROID) 25 MCG tablet Take 1 tablet (25 mcg) by mouth every morning (before breakfast).       No current facility-administered medications for this visit.       ROS:  10 point ROS of systems including Constitutional, Eyes, Respiratory, Cardiovascular, Gastroenterology, Genitourinary, Integumentary, Musculoskeletal, Psychiatric were all negative except for pertinent positives noted in my HPI.    Vitals:  BP (!) 153/83   Pulse 86   Temp 98.1  F (36.7  C)   Resp 18   Ht 1.727 m (5' 8\")   Wt 119.7 kg (264 lb)   SpO2 99%   BMI 40.14 kg/m    Exam:  Physical Exam  Vitals (Facility EMR) reviewed.   Constitutional:       General: She is not in acute distress.     Appearance: She is obese.   HENT:      Head: Normocephalic and atraumatic. "   Eyes:      General: No scleral icterus.  Cardiovascular:      Rate and Rhythm: Normal rate and regular rhythm.   Pulmonary:      Effort: Pulmonary effort is normal.      Breath sounds: No wheezing.   Musculoskeletal:      Right lower leg: No edema.      Left lower leg: No edema.   Skin:     General: Skin is warm and dry.      Findings: No rash.   Neurological:      Mental Status: She is alert. Mental status is at baseline.   Psychiatric:         Behavior: Behavior normal.           Lab/Diagnostic data:  Recent labs in [facility] reviewed by me today.  and Most Recent 3 CBC's:  Recent Labs   Lab Test 04/24/25  0709 04/23/25  1553 10/24/20  2238   WBC 7.9 12.6* 7.6   HGB 11.4* 12.4 10.3*   MCV 93 94 85    172 198     Most Recent 3 BMP's:  Recent Labs   Lab Test 04/26/25  0627 04/25/25  0719 04/24/25  0709 04/23/25  1553 04/23/25  1553 10/24/20  2238   NA  --   --  141  --  141 139   POTASSIUM 3.9 3.6 4.0   < > 4.3 4.1   CHLORIDE  --   --  108*  --  107 109   CO2  --   --  20*  --  20* 24   BUN  --   --  16.3  --  16.6 17   CR  --   --  1.02*  --  1.17* 1.06*   ANIONGAP  --   --  13  --  14 6   MARTHA  --   --  9.2  --  9.3 8.4*   GLC  --   --  99  --  135* 125*    < > = values in this interval not displayed.     Most Recent 2 LFT's:No lab results found.  7-Day Micro Results       Collected Updated Procedure Result Status      04/28/2025 0706 04/28/2025 1022 Quantiferon TB Gold Plus Grey Tube [[id number]]   Blood from Hand, Right    In process Component Value   No component results            04/28/2025 0706 04/28/2025 1022 Quantiferon TB Gold Plus Green Tube [[id number]]   Blood from Hand, Right    In process Component Value   No component results            04/28/2025 0706 04/28/2025 1022 Quantiferon TB Gold Plus Yellow Tube [[id number]]   Blood from Hand, Right    In process Component Value   No component results            04/28/2025 0706 04/28/2025 1022 Quantiferon TB Gold Plus Purple Tube [[id number]]    Blood from Hand, Right    In process Component Value   No component results            04/23/2025 1802 04/26/2025 0638 Urine Culture [[id number]]    (Abnormal)   Urine, Catheter    Final result Component Value   Culture >100,000 CFU/mL Klebsiella pneumoniae    >100,000 CFU/mL Aerococcus urinae    Aerococcus species is usually susceptible to penicillin, cephalosporins, tetracycline and vancomycin.        Susceptibility        Klebsiella pneumoniae      CHELLE      Ampicillin  Resistant  [1]       Ampicillin/ Sulbactam 4 ug/mL Susceptible      Cefazolin 2 ug/mL Susceptible      Cefepime <=0.12 ug/mL Susceptible      Ceftazidime <=0.5 ug/mL Susceptible      Ceftriaxone <=0.25 ug/mL Susceptible      Ciprofloxacin <=0.06 ug/mL Susceptible      Gentamicin <=1 ug/mL Susceptible      Levofloxacin <=0.12 ug/mL Susceptible      Nitrofurantoin 64 ug/mL Intermediate      Piperacillin/Tazobactam <=4 ug/mL Susceptible      Trimethoprim/Sulfamethoxazole <=1/19 ug/mL Susceptible                   [1]  Intrinsically Resistant                    04/23/2025 1713 04/27/2025 2016 Blood Culture Hand, Right [[id number]]   Blood from Hand, Right    Preliminary result Component Value   Culture No growth after 4 days  [P]                04/23/2025 1649 04/27/2025 2016 Blood Culture Peripheral Blood [[id number]]   Peripheral Blood    Preliminary result Component Value   Culture No growth after 4 days  [P]                      Most Recent TSH and T4:  Recent Labs   Lab Test 04/23/25  1553   TSH 24.51*   T4 0.87*     Most Recent Hemoglobin A1c:No lab results found.  Most Recent Urinalysis:  Recent Labs   Lab Test 04/23/25  1802   COLOR Light Yellow   APPEARANCE Clear   URINEGLC Negative   URINEBILI Negative   URINEKETONE Trace*   SG 1.010   UBLD Small*   URINEPH 6.5   PROTEIN 20*   NITRITE Negative   LEUKEST Large*   RBCU 14*   WBCU 33*     Most Recent Anemia Panel:  Recent Labs   Lab Test 04/24/25  0709   WBC 7.9   HGB 11.4*   HCT 34.6*    MCV 93        Procedure  Echocardiogram with two-dimensional, color and spectral Doppler. Optison (NDC  #[id number]) given intravenously. Adequate quality two-dimensional was  performed and interpreted.  ______________________________________________________________________________  Interpretation Summary     1. Normal left ventricular size and systolic function. Estimated ejection  fraction is 60 to 65%.  2. Normal right ventricular size and systolic function.  3. Mild left atrial enlargement.  4. Mild calcific aortic valve stenosis. Aortic valve mean systolic gradient is  12 mmHg. Aortic valve area by Doppler is 1.7 cmÂ .  5. No prior exam available for comparison.  ________________________________________  ASSESSMENT/PLAN:  Urinary tract infection  Sepsis: Never had urinary symptoms.  Received ceftriaxone in the hospital and transition to oral at discharge  Ciprofloxacin through 4/29  Monitor after antibiotics are completed    Atrial fibrillation with RVR: Initiated on apixaban.  Not requiring rate controlling medications  Continue apixaban  Zio patch ordered.  Discussed with patient/family confirmed to TCU when arrives at home    Hypothyroidism: New diagnosis.  Symptomatic with increased fatigue, weakness, decreased appetite  Continue levothyroxine 25 mcg daily  Repeat thyroid function tests in 4-6 weeks  Currently does not have PCP.  Referral placed    Generalized weakness: Multifactorial due to the above.  Declining functional status over the past several months.  Treat hypothyroidism and UTI as above  Check vitamin D level and CK  PT/OT    This note was completed in part using Dragon voice recognition software. Although reviewed after completion, some word and grammatical errors may occur.        Electronically signed by:  Mariangel Carrera PA-C

## 2025-04-29 ENCOUNTER — PATIENT OUTREACH (OUTPATIENT)
Dept: CARE COORDINATION | Facility: CLINIC | Age: 70
End: 2025-04-29
Payer: COMMERCIAL

## 2025-04-29 LAB
QUANTIFERON MITOGEN: 2.79 IU/ML
QUANTIFERON NIL TUBE: 0.04 IU/ML
QUANTIFERON TB1 TUBE: 0.06 IU/ML
QUANTIFERON TB2 TUBE: 0.05

## 2025-04-29 NOTE — LETTER
[facility]   To:     Please give to facility    From:   Adore Lang MA  Care Transitions Assistant    Ambulatory Care Management team  [facility]   Phone: [phone]  Email: [email or web address]  Patient Name:  Gi Zhao YOB: 1955   Admit date: 4/28/25      *Information Needed:  Please contact me when the patient will discharge (or if they will move to long term care)- include the discharge date, disposition, and main diagnosis   If the patient is discharged with home care services, please provide the name of the agency.       Thank you!

## 2025-04-30 LAB
CK SERPL-CCNC: 64 U/L (ref 26–192)
GAMMA INTERFERON BACKGROUND BLD IA-ACNC: 0.04 IU/ML
M TB IFN-G BLD-IMP: NEGATIVE
M TB IFN-G CD4+ BCKGRND COR BLD-ACNC: 2.75 IU/ML
MITOGEN IGNF BCKGRD COR BLD-ACNC: 0.01 IU/ML
MITOGEN IGNF BCKGRD COR BLD-ACNC: 0.02 IU/ML
VIT D+METAB SERPL-MCNC: 42 NG/ML (ref 20–50)

## 2025-04-30 PROCEDURE — 82550 ASSAY OF CK (CPK): CPT | Mod: ORL | Performed by: INTERNAL MEDICINE

## 2025-04-30 PROCEDURE — P9603 ONE-WAY ALLOW PRORATED MILES: HCPCS | Mod: ORL | Performed by: INTERNAL MEDICINE

## 2025-04-30 PROCEDURE — 36415 COLL VENOUS BLD VENIPUNCTURE: CPT | Mod: ORL | Performed by: INTERNAL MEDICINE

## 2025-04-30 PROCEDURE — 82306 VITAMIN D 25 HYDROXY: CPT | Mod: ORL | Performed by: INTERNAL MEDICINE

## 2025-05-08 ENCOUNTER — TRANSITIONAL CARE UNIT VISIT (OUTPATIENT)
Dept: GERIATRICS | Facility: CLINIC | Age: 70
End: 2025-05-08
Payer: COMMERCIAL

## 2025-05-08 VITALS
BODY MASS INDEX: 40.89 KG/M2 | RESPIRATION RATE: 19 BRPM | HEART RATE: 82 BPM | DIASTOLIC BLOOD PRESSURE: 80 MMHG | WEIGHT: 269.8 LBS | TEMPERATURE: 96.9 F | SYSTOLIC BLOOD PRESSURE: 127 MMHG | OXYGEN SATURATION: 96 % | HEIGHT: 68 IN

## 2025-05-08 DIAGNOSIS — I87.8 VENOUS STASIS: ICD-10-CM

## 2025-05-08 DIAGNOSIS — M62.81 GENERALIZED MUSCLE WEAKNESS: Primary | ICD-10-CM

## 2025-05-08 RX ORDER — AMMONIUM LACTATE 12 G/100G
LOTION TOPICAL 2 TIMES DAILY
Status: SHIPPED
Start: 2025-05-08

## 2025-05-08 NOTE — LETTER
" 5/8/2025      Gi Zhao  [address]  [place] [address]          Chief Complaint   Patient presents with     Nursing Home Acute       HPI:  Gi Zhao is a 70 year old  (1955), who is being seen today for an episodic care visit at: [facility] ([facility]) [[id number]].     Brief summary: Gi Zhao is a 70-year-old female with a past medical history of osteoarthritis who was recently hospitalized at [facility].  Presented for evaluation of generalized weakness.  Found to have UTI as well as atrial fibrillation with RVR.  Additionally, new diagnosis of hypothyroidism.  Urinary tract infection treated with antibiotics.  Converted out of atrial fibrillation and did not require further rate reducing medications.  Was initiated on apixaban at discharge.  Initiated on levothyroxine for suspected new diagnosis of hypothyroidism.  Deconditioned compared to baseline so discharged to [facility]    Today's concern is:  Wearing ZioPatch. CC earlier this week. Considering adding stair lift to home. Denies pain. Chronic dry, flaky skin on lower legs.       Spoke with PT. Standing for up 3 mins. Still sandrita lift with nursing.        Review of nursing home EMR:-142, Wt 270,      Allergies, and PMH/PSH reviewed in [employer] today.    REVIEW OF SYSTEMS:  4 point ROS including Respiratory, CV, GI and , other than that noted in the HPI,  is negative    Objective:   /80   Pulse 82   Temp 96.9  F (36.1  C)   Resp 19   Ht 1.727 m (5' 8\")   Wt 122.4 kg (269 lb 12.8 oz)   SpO2 96%   BMI 41.02 kg/m      Physical Exam  Vitals (Facility EMR) reviewed.   Constitutional:       General: She is not in acute distress.     Appearance: She is obese.   HENT:      Head: Normocephalic and atraumatic.   Eyes:      General: No scleral icterus.  Cardiovascular:      Rate and Rhythm: Normal rate and regular rhythm.   Pulmonary:      Effort: Pulmonary effort is normal.      Breath sounds: No wheezing. "   Musculoskeletal:      Right lower leg: Edema present.      Left lower leg: Edema present.   Skin:     General: Skin is warm and dry.      Comments: Chronic venous stasis changes to lower extremities with dry flaky skin   Neurological:      Mental Status: She is alert. Mental status is at baseline.   Psychiatric:         Behavior: Behavior normal.          MED REC REQUIRED  Post Medication Reconciliation Status: discharge medications reconciled and changed, per note/orders      Recent labs in [facility] reviewed by me today.  and Most Recent 3 CBC's:  Recent Labs   Lab Test 04/24/25  0709 04/23/25  1553 10/24/20  2238   WBC 7.9 12.6* 7.6   HGB 11.4* 12.4 10.3*   MCV 93 94 85    172 198     Most Recent 3 BMP's:  Recent Labs   Lab Test 04/26/25  0627 04/25/25  0719 04/24/25  0709 04/23/25  1553 04/23/25  1553 10/24/20  2238   NA  --   --  141  --  141 139   POTASSIUM 3.9 3.6 4.0   < > 4.3 4.1   CHLORIDE  --   --  108*  --  107 109   CO2  --   --  20*  --  20* 24   BUN  --   --  16.3  --  16.6 17   CR  --   --  1.02*  --  1.17* 1.06*   ANIONGAP  --   --  13  --  14 6   MARTHA  --   --  9.2  --  9.3 8.4*   GLC  --   --  99  --  135* 125*    < > = values in this interval not displayed.     Most Recent TSH and T4:  Recent Labs   Lab Test 04/23/25  1553   TSH 24.51*   T4 0.87*       Assessment/Plan:  Urinary tract infection, resolved  Sepsis: Never had urinary symptoms.  Received ceftriaxone in the hospital and transition to oral at discharge  Ciprofloxacin completed 4/29  Monitor    Atrial fibrillation with RVR: Initiated on apixaban.  Not requiring rate controlling medications  Continue apixaban  Zio patch in place at TCU    Hypothyroidism: New diagnosis.  Symptomatic with increased fatigue, weakness, decreased appetite  Continue levothyroxine 25 mcg daily  Repeat thyroid function tests in 4-6 weeks  (Early June)  Needs new community PCP, referral placed on TCU admission      Generalized weakness: Multifactorial due to  the above.  Declining functional status over the past several months.  Treat hypothyroidism and UTI as above  Vit D and CK WNL  PT/OT    Venous stasis: Dry flaky skin chronic venous stasis changes  Add Lac-Hydrin to bilateral lower extremities twice daily    Orders:  As above      Electronically signed by: Mariangel Carrera PA-C       Sincerely,        Mariangel Carrera PA-C    Electronically signed

## 2025-05-08 NOTE — PROGRESS NOTES
"  Chief Complaint   Patient presents with    Nursing Home Acute       HPI:  Gi Zhao is a 70 year old  (1955), who is being seen today for an episodic care visit at: [facility] ([facility]) [[id number]].     Brief summary: Gi Zhao is a 70-year-old female with a past medical history of osteoarthritis who was recently hospitalized at [facility] in [place].  Presented for evaluation of generalized weakness.  Found to have UTI as well as atrial fibrillation with RVR.  Additionally, new diagnosis of hypothyroidism.  Urinary tract infection treated with antibiotics.  Converted out of atrial fibrillation and did not require further rate reducing medications.  Was initiated on apixaban at discharge.  Initiated on levothyroxine for suspected new diagnosis of hypothyroidism.  Deconditioned compared to baseline so discharged to U    Today's concern is:  Wearing ZioPatch. CC earlier this week. Considering adding stair lift to home. Denies pain. Chronic dry, flaky skin on lower legs.       Spoke with PT. Standing for up 3 mins. Still sandrita lift with nursing.        Review of nursing home EMR:-142, Wt 270,      Allergies, and PMH/PSH reviewed in [employer] today.    REVIEW OF SYSTEMS:  4 point ROS including Respiratory, CV, GI and , other than that noted in the HPI,  is negative    Objective:   /80   Pulse 82   Temp 96.9  F (36.1  C)   Resp 19   Ht 1.727 m (5' 8\")   Wt 122.4 kg (269 lb 12.8 oz)   SpO2 96%   BMI 41.02 kg/m      Physical Exam  Vitals (Facility EMR) reviewed.   Constitutional:       General: She is not in acute distress.     Appearance: She is obese.   HENT:      Head: Normocephalic and atraumatic.   Eyes:      General: No scleral icterus.  Cardiovascular:      Rate and Rhythm: Normal rate and regular rhythm.   Pulmonary:      Effort: Pulmonary effort is normal.      Breath sounds: No wheezing.   Musculoskeletal:      Right lower leg: Edema present.      Left lower leg: Edema " present.   Skin:     General: Skin is warm and dry.      Comments: Chronic venous stasis changes to lower extremities with dry flaky skin   Neurological:      Mental Status: She is alert. Mental status is at baseline.   Psychiatric:         Behavior: Behavior normal.          MED REC REQUIRED  Post Medication Reconciliation Status: discharge medications reconciled and changed, per note/orders      Recent labs in [facility] reviewed by me today.  and Most Recent 3 CBC's:  Recent Labs   Lab Test 04/24/25  0709 04/23/25  1553 10/24/20  2238   WBC 7.9 12.6* 7.6   HGB 11.4* 12.4 10.3*   MCV 93 94 85    172 198     Most Recent 3 BMP's:  Recent Labs   Lab Test 04/26/25  0627 04/25/25  0719 04/24/25  0709 04/23/25  1553 04/23/25  1553 10/24/20  2238   NA  --   --  141  --  141 139   POTASSIUM 3.9 3.6 4.0   < > 4.3 4.1   CHLORIDE  --   --  108*  --  107 109   CO2  --   --  20*  --  20* 24   BUN  --   --  16.3  --  16.6 17   CR  --   --  1.02*  --  1.17* 1.06*   ANIONGAP  --   --  13  --  14 6   MARTHA  --   --  9.2  --  9.3 8.4*   GLC  --   --  99  --  135* 125*    < > = values in this interval not displayed.     Most Recent TSH and T4:  Recent Labs   Lab Test 04/23/25  1553   TSH 24.51*   T4 0.87*       Assessment/Plan:  Urinary tract infection, resolved  Sepsis: Never had urinary symptoms.  Received ceftriaxone in the hospital and transition to oral at discharge  Ciprofloxacin completed 4/29  Monitor    Atrial fibrillation with RVR: Initiated on apixaban.  Not requiring rate controlling medications  Continue apixaban  Zio patch in place at TCU    Hypothyroidism: New diagnosis.  Symptomatic with increased fatigue, weakness, decreased appetite  Continue levothyroxine 25 mcg daily  Repeat thyroid function tests in 4-6 weeks  (Early June)  Needs new community PCP, referral placed on TCU admission      Generalized weakness: Multifactorial due to the above.  Declining functional status over the past several months.  Treat  hypothyroidism and UTI as above  Vit D and CK WNL  PT/OT    Venous stasis: Dry flaky skin chronic venous stasis changes  Add Lac-Hydrin to bilateral lower extremities twice daily    Orders:  As above      Electronically signed by: Mariangel Carrera PA-C

## 2025-05-08 NOTE — CONFIDENTIAL NOTE
May 8, 2025    Gi Zhao  1955      ORDERS:    Lac-Hydrin 12% lotion. Apply bid to bilateral lower legs. Dx venous stasis      Mariangel Carrera PA-C

## 2025-05-12 ENCOUNTER — TRANSITIONAL CARE UNIT VISIT (OUTPATIENT)
Dept: GERIATRICS | Facility: CLINIC | Age: 70
End: 2025-05-12
Payer: COMMERCIAL

## 2025-05-12 VITALS
TEMPERATURE: 98.1 F | OXYGEN SATURATION: 99 % | RESPIRATION RATE: 18 BRPM | WEIGHT: 270 LBS | HEIGHT: 68 IN | SYSTOLIC BLOOD PRESSURE: 161 MMHG | BODY MASS INDEX: 40.92 KG/M2 | DIASTOLIC BLOOD PRESSURE: 69 MMHG | HEART RATE: 79 BPM

## 2025-05-12 DIAGNOSIS — R29.6 RECURRENT FALLS: Primary | ICD-10-CM

## 2025-05-12 DIAGNOSIS — R03.0 ELEVATED BLOOD PRESSURE READING WITHOUT DIAGNOSIS OF HYPERTENSION: ICD-10-CM

## 2025-05-12 DIAGNOSIS — I48.91 ATRIAL FIBRILLATION WITH RAPID VENTRICULAR RESPONSE (H): ICD-10-CM

## 2025-05-12 DIAGNOSIS — E66.01 MORBID OBESITY (H): ICD-10-CM

## 2025-05-12 DIAGNOSIS — R53.81 PHYSICAL DECONDITIONING: ICD-10-CM

## 2025-05-12 DIAGNOSIS — Z87.440 PERSONAL HISTORY OF URINARY TRACT INFECTION: ICD-10-CM

## 2025-05-12 DIAGNOSIS — I89.0 LYMPHEDEMA: ICD-10-CM

## 2025-05-12 DIAGNOSIS — E03.9 HYPOTHYROIDISM, UNSPECIFIED TYPE: ICD-10-CM

## 2025-05-12 DIAGNOSIS — I87.8 VENOUS STASIS: ICD-10-CM

## 2025-05-12 PROBLEM — N30.01 ACUTE CYSTITIS WITH HEMATURIA: Status: RESOLVED | Noted: 2025-04-23 | Resolved: 2025-05-12

## 2025-05-12 PROBLEM — A41.9 SEPSIS, DUE TO UNSPECIFIED ORGANISM, UNSPECIFIED WHETHER ACUTE ORGAN DYSFUNCTION PRESENT (H): Status: RESOLVED | Noted: 2025-04-23 | Resolved: 2025-05-12

## 2025-05-12 PROCEDURE — 99305 1ST NF CARE MODERATE MDM 35: CPT | Performed by: INTERNAL MEDICINE

## 2025-05-12 RX ORDER — HYDROCHLOROTHIAZIDE 12.5 MG/1
12.5 TABLET ORAL DAILY
Qty: 30 TABLET | Refills: 0 | Status: SHIPPED | OUTPATIENT
Start: 2025-05-12

## 2025-05-12 NOTE — Clinical Note
Did start hydrochlorothiazide and labs planned Monday 5/19. Email being sent re: PCP as I couldn't find Guzman on site yesterday.

## 2025-05-12 NOTE — PROGRESS NOTES
[facility]  PHYSICIAN NOTE    PRIMARY CARE PROVIDER AND CLINIC:  Physician No Ref-Primary, No address on file    Chief Complaint   Patient presents with    Hospital F/U     [place] Medical Record Number:  [id number]  Place of Service where encounter took place:  [facility] ([facility]) [[id number]]    Gi Zhao is a 70 year old (1955), admitted to the above facility from  [facility]. Hospital stay 4/23/25 through 4/26/25. Admitted to this facility for  rehab, medical management, and nursing care.     HPI:    HPI information obtained from: facility chart records, facility staff, patient report, and [facility] chart review.     Brief summary of hospital course: Gi Zhao is a 70yoF with no significant past medical history who was admitted from home after she accidentally rolled out of bed and was unable to get up unassisted.  Was jammed between her bed and a dresser and estimates was down on the ground for about an hour before her son came home to help her and EMS was alerted.  She attempted to ambulate with a walker with EMS present but was unable to do so so was brought into the hospital.  Thankfully no significant injuries.  She admits that this was in the setting of several months of a failure to thrive like picture where she was feeling low motivation and generalized weakness.  She was contemplating going to the doctor or therapist but had not done so as of yet.  While in the hospital she was diagnosed with new onset A-fib and started on anticoagulant as well as outpatient Zio patch.  Also new onset hypothyroidism started on levothyroxine.  Due to deconditioning she was discharged to a TCU which she was thankful for that recommendation as recognized she was unable to return home safely.    Updates on status since skilled nursing admission:   Recent vitals: Afebrile, -160/60-90, HR 70-90 and weight up from 265 to 270#. Cognitive scores: SLUMS  26/30 and CPT 4.8/5.6.  She is seen sitting up in her room and welcomes the visit.  Had occupational therapy earlier today with physical therapy planned later this afternoon.  Is really starting to feel the momentum increase with improvement and she is getting excited about this.  Told me the above history as noted in HPI about what was going on prior to her hospitalization with slow progressive deconditioning.  Says that the therapists here have been very helpful at first trying to gain strength and stability and last Thursday and Friday she was so excited to be able to take some steps along the parallel bars.  Is excited to see what therapy has planned for her this afternoon.  Also recognizes it has been helpful to have 3 well-balanced meals here per day as has not had that for years as would usually only have 1 or 2 meals at home.  Denies any shortness of breath but has recognized that she does have some significant lower extremity edema.  Says that in the past she had worse edema and could not tolerate compression hose.  However, over the weekend, nursing staff did put Tubigrip's on which she actually likes and feels have been slightly helpful.  We talk about some of her blood pressures have been elevated and she recognizes this has been the case in the past too and is willing to start a low-dose diuretic.  Does have an incontinence brief on currently and says she is not worried about any increase in urinary frequency.  She continues to have the ZIO in place.  Only using as needed Tylenol at most once per day for some lower extremity discomfort.  Hoping to eventually get stronger and discharge home.  She and I spoke about the new onset A-fib and says that she is familiar with that diagnosis as it runs in her family.  She understands the reason for the anticoagulant is to reduce the risk of stroke.  She also talked about the new onset hypothyroidism.  She related that she was thinking she was depressed prior to  all this as she was feeling so many of the symptoms of undiagnosed hypothyroidism.  Is really feeling motivated for continued improvement and is aware to follow-up with the new PCP as an outpatient that is yet to be scheduled.  In talking with her nurse, the only concern is the lymphedema as noted above and the elevated blood pressures which we are addressing today.      CODE STATUS/ADVANCE DIRECTIVES DISCUSSION:   CPR/Full code   Patient's living condition: Her son lives with her    ALLERGIES: Oxycontin [oxycodone]      Past Medical History:   Diagnosis Date    Hypothyroidism 04/2025    Diagnosed during hospital stay after a fall    Obesity     Pain in both knees     Paroxysmal atrial fibrillation (H) 04/2025    Diagnosed during hospital stay after a fall      Past Surgical History:   Procedure Laterality Date    ARTHROPLASTY KNEE BILATERAL Bilateral 4/6/2015    Procedure: ARTHROPLASTY KNEE BILATERAL;  Surgeon: Tate Milan MD;  Location: [facility] OR    CLOSED REDUCTION LOWER EXTREMITY Right 6/19/2015    Procedure: CLOSED REDUCTION LOWER EXTREMITY;  Surgeon: Wilton Gonzalez MD;  Location: [facility] OR    MANDIBLE SURGERY      REPAIR RETINACULAR OPEN MEDIAL OR LATERAL Bilateral 4/21/2015    Procedure: REPAIR RETINACULAR OPEN MEDIAL OR LATERAL;  Surgeon: Tate Milan MD;  Location: [facility] OR    REPAIR TENDON QUADRICEPS Right 6/24/2015    Procedure: REPAIR TENDON QUADRICEPS;  Surgeon: Tate Milan MD;  Location: [facility] OR    REPAIR TENDON QUADRICEPS Right 11/9/2015    Procedure: REPAIR TENDON QUADRICEPS;  Surgeon: Tate Milan MD;  Location: [facility] OR         Post-discharge medication reconciliation status: Reviewed and updated in [facility] according to facility MAR    Current Outpatient Medications   Medication Sig Dispense Refill    acetaminophen (TYLENOL 8 HOUR ARTHRITIS PAIN) 650 MG CR tablet Take 1,300 mg by mouth every 8 hours as needed for mild pain or fever.      ammonium lactate  "(LAC-HYDRIN) 12 % external lotion Apply topically 2 times daily.      apixaban ANTICOAGULANT (ELIQUIS) 5 MG tablet Take 1 tablet (5 mg) by mouth 2 times daily.      levothyroxine (SYNTHROID/LEVOTHROID) 25 MCG tablet Take 1 tablet (25 mcg) by mouth every morning (before breakfast).         ROS:  4 point ROS including Respiratory, CV, GI and , other than that noted in the HPI,  is negative    Exam:  BP (!) 161/69   Pulse 79   Temp 98.1  F (36.7  C)   Resp 18   Ht 1.727 m (5' 8\")   Wt 122.5 kg (270 lb)   SpO2 99%   BMI 41.05 kg/m    Alert, pleasant, casually dressed, appears stated age  Moist dry mucosa  No scleral icterus  Heart tones regular at this time without murmur rub or gallop  Breathing unlabored on room air, no cough, clear posteriorly  Abdomen obese  Significant lymphedema of the bilateral lower extremities with Tubigrip's in place  Normal speech, no tremor, engaged in visit  Mood euthymic    Lab/Diagnostic data:  Most Recent 3 CBC's:  Recent Labs   Lab Test 04/24/25  0709 04/23/25  1553 10/24/20  2238   WBC 7.9 12.6* 7.6   HGB 11.4* 12.4 10.3*   MCV 93 94 85    172 198     Most Recent 3 BMP's:  Recent Labs   Lab Test 04/26/25  0627 04/25/25  0719 04/24/25  0709 04/23/25  1553 04/23/25  1553 10/24/20  2238   NA  --   --  141  --  141 139   POTASSIUM 3.9 3.6 4.0   < > 4.3 4.1   CHLORIDE  --   --  108*  --  107 109   CO2  --   --  20*  --  20* 24   BUN  --   --  16.3  --  16.6 17   CR  --   --  1.02*  --  1.17* 1.06*   ANIONGAP  --   --  13  --  14 6   MARTHA  --   --  9.2  --  9.3 8.4*   GLC  --   --  99  --  135* 125*    < > = values in this interval not displayed.   Estimated Creatinine Clearance: 70.7 mL/min (A) (based on SCr of 1.02 mg/dL (H)).    Most Recent 2 LFT's:No lab results found.  Most Recent TSH and T4:  Recent Labs   Lab Test 04/23/25  1553   TSH 24.51*   T4 0.87*     Most Recent Hemoglobin A1c:No lab results found.    UA RESULTS:  Recent Labs   Lab Test 04/23/25  1802   COLOR " Light Yellow   APPEARANCE Clear   URINEGLC Negative   URINEBILI Negative   URINEKETONE Trace*   SG 1.010   UBLD Small*   URINEPH 6.5   PROTEIN 20*   NITRITE Negative   LEUKEST Large*   RBCU 14*   WBCU 33*      TTE:  Interpretation Summary  1. Normal left ventricular size and systolic function. Estimated ejection  fraction is 60 to 65%.  2. Normal right ventricular size and systolic function.  3. Mild left atrial enlargement.  4. Mild calcific aortic valve stenosis. Aortic valve mean systolic gradient is  12 mmHg. Aortic valve area by Doppler is 1.7 cmÂ .  5. No prior exam available for comparison.    Hospital imaging:  CXR: IMPRESSION: Technically limited exam. Heart size is likely enlarged. Prominence of central pulmonary vasculature and increased interstitial density suspicious for early edema. No lobar consolidation, effusions, or pneumothorax. Advanced degenerative changes in both shoulders, left greater than right.    IMPRESSION:  HEAD CT:  1.  Superficial soft tissue contusion/subgaleal hematoma is seen along the posterior superior parietal scalp.  2.  No finding for intracranial hemorrhage, mass, or acute infarct.  3.  Mild volume loss and presumed sequela chronic microvascular ischemic change.     CERVICAL SPINE CT:  1.  Moderate to advanced cervical spondylosis without finding for acute fracture.  2.  Grade I anterolisthesis C3 on C4, C4 on C5 and C7 on T1. Listhesis at these levels is age-indeterminate but favored to be chronic and degenerative in etiology. Correlate for cervical pain.      ASSESSMENT/PLAN:  Recurrent falls  Physical deconditioning  She is so glad that she is working here at the TCU with therapy to gain strength that she had slowly lost over time at home  Was walking along the parallel bars a few steps prior to the weekend and is looking forward to what she will do with therapy today  Does have Tylenol for generalized aches and pains usually only using 0 or 1 dose per day    Personal  history of urinary tract infection  We talked about this as well and she recognizes she was treated for a UTI in the hospital with antibiotics  Says she had no symptoms of infection that she was aware of at that time  It is possible that some of her presenting weakness was related to multifactorial reasons as otherwise described and that bacteriuria was otherwise asymptomatic however, understand it was understandably reasonable to treat that with antimicrobial therapy in the hospital  No current signs or symptoms of infection    Morbid obesity (H)  Noted comorbidity that contributes to her overall health  Follow weight trend periodically with her now getting healthy nutritious meals at the facility and with treatment of her new diagnosed hypothyroidism  CMP and A1c on Monday 5/19/25 as no LFTs or A1c on file and could contribute to comorbidities    Hypothyroidism, unspecified type  Initiated on levothyroxine in the hospital  Follow-up with new PCP as an outpatient to follow trend and adjust dosing as needed    Atrial fibrillation with rapid ventricular response (H)  She is aware of this new diagnosis and has Zio patch in place  Has otherwise been asymptomatic  Heart tones do sound regular on exam today  She and I also spoke regarding the anticoagulant Eliquis which she is taking to reduce the risk of stroke  Rate is controlled  She does have h/o snoring but feels better controlled recently; asked she discuss with nursing staff overnight  She and I spoke re: association between afib and JUSTIN and she says she has family history of both so understands  May need JUSTIN eval as an outpatient if indicated    Elevated blood pressure reading without diagnosis of hypertension  Lymphedema  Venous stasis  She agreed to start hydrochlorothiazide 12.5 mg daily for lymphedema and elevated BPs  Also FRANKO diet  Continue TG which she has liked over the moisturizing cream  No systemic symptoms of fluid overload and echo with normal  EF  CMP and A1c on Monday 5/19/25            Electronically signed by:  Mallorie Avila DO

## 2025-05-12 NOTE — LETTER
5/12/2025      Gi Zhao  [address]  [place] [address]        [facility]  PHYSICIAN NOTE    PRIMARY CARE PROVIDER AND CLINIC:  Physician No Ref-Primary, No address on file    Chief Complaint   Patient presents with     Hospital F/U     [place] Medical Record Number:  [id number]  Place of Service where encounter took place:  [facility] ([facility]) [[id number]]    Gi Zhao is a 70 year old (1955), admitted to the above facility from  [facility]. Hospital stay 4/23/25 through 4/26/25. Admitted to this facility for  rehab, medical management, and nursing care.     HPI:    HPI information obtained from: facility chart records, facility staff, patient report, and [facility] chart review.     Brief summary of hospital course: Gi Zhao is a 70yoF with no significant past medical history who was admitted from home after she accidentally rolled out of bed and was unable to get up unassisted.  Was jammed between her bed and a dresser and estimates was down on the ground for about an hour before her son came home to help her and EMS was alerted.  She attempted to ambulate with a walker with EMS present but was unable to do so so was brought into the hospital.  Thankfully no significant injuries.  She admits that this was in the setting of several months of a failure to thrive like picture where she was feeling low motivation and generalized weakness.  She was contemplating going to the doctor or therapist but had not done so as of yet.  While in the hospital she was diagnosed with new onset A-fib and started on anticoagulant as well as outpatient Zio patch.  Also new onset hypothyroidism started on levothyroxine.  Due to deconditioning she was discharged to a TCU which she was thankful for that recommendation as recognized she was unable to return home safely.    Updates on status since skilled nursing admission:   Recent vitals: Afebrile, BP  120-160/60-90, HR 70-90 and weight up from 265 to 270#. Cognitive scores: SLUMS 26/30 and CPT 4.8/5.6.  She is seen sitting up in her room and welcomes the visit.  Had occupational therapy earlier today with physical therapy planned later this afternoon.  Is really starting to feel the momentum increase with improvement and she is getting excited about this.  Told me the above history as noted in HPI about what was going on prior to her hospitalization with slow progressive deconditioning.  Says that the therapists here have been very helpful at first trying to gain strength and stability and last Thursday and Friday she was so excited to be able to take some steps along the parallel bars.  Is excited to see what therapy has planned for her this afternoon.  Also recognizes it has been helpful to have 3 well-balanced meals here per day as has not had that for years as would usually only have 1 or 2 meals at home.  Denies any shortness of breath but has recognized that she does have some significant lower extremity edema.  Says that in the past she had worse edema and could not tolerate compression hose.  However, over the weekend, nursing staff did put Tubigrip's on which she actually likes and feels have been slightly helpful.  We talk about some of her blood pressures have been elevated and she recognizes this has been the case in the past too and is willing to start a low-dose diuretic.  Does have an incontinence brief on currently and says she is not worried about any increase in urinary frequency.  She continues to have the ZIO in place.  Only using as needed Tylenol at most once per day for some lower extremity discomfort.  Hoping to eventually get stronger and discharge home.  She and I spoke about the new onset A-fib and says that she is familiar with that diagnosis as it runs in her family.  She understands the reason for the anticoagulant is to reduce the risk of stroke.  She also talked about the new onset  hypothyroidism.  She related that she was thinking she was depressed prior to all this as she was feeling so many of the symptoms of undiagnosed hypothyroidism.  Is really feeling motivated for continued improvement and is aware to follow-up with the new PCP as an outpatient that is yet to be scheduled.  In talking with her nurse, the only concern is the lymphedema as noted above and the elevated blood pressures which we are addressing today.      CODE STATUS/ADVANCE DIRECTIVES DISCUSSION:   CPR/Full code   Patient's living condition: Her son lives with her    ALLERGIES: Oxycontin [oxycodone]      Past Medical History:   Diagnosis Date     Hypothyroidism 04/2025    Diagnosed during hospital stay after a fall     Obesity      Pain in both knees      Paroxysmal atrial fibrillation (H) 04/2025    Diagnosed during hospital stay after a fall      Past Surgical History:   Procedure Laterality Date     ARTHROPLASTY KNEE BILATERAL Bilateral 4/6/2015    Procedure: ARTHROPLASTY KNEE BILATERAL;  Surgeon: Tate Milan MD;  Location: [facility] OR     CLOSED REDUCTION LOWER EXTREMITY Right 6/19/2015    Procedure: CLOSED REDUCTION LOWER EXTREMITY;  Surgeon: Wilton Gonzalez MD;  Location: [facility] OR     MANDIBLE SURGERY       REPAIR RETINACULAR OPEN MEDIAL OR LATERAL Bilateral 4/21/2015    Procedure: REPAIR RETINACULAR OPEN MEDIAL OR LATERAL;  Surgeon: Tate Milan MD;  Location: [facility] OR     REPAIR TENDON QUADRICEPS Right 6/24/2015    Procedure: REPAIR TENDON QUADRICEPS;  Surgeon: Tate Milan MD;  Location: [facility] OR     REPAIR TENDON QUADRICEPS Right 11/9/2015    Procedure: REPAIR TENDON QUADRICEPS;  Surgeon: Tate Milan MD;  Location: [facility] OR         Post-discharge medication reconciliation status: Reviewed and updated in [facility] according to facility MAR    Current Outpatient Medications   Medication Sig Dispense Refill     acetaminophen (TYLENOL 8 HOUR ARTHRITIS PAIN) 650 MG CR tablet Take 1,300  "mg by mouth every 8 hours as needed for mild pain or fever.       ammonium lactate (LAC-HYDRIN) 12 % external lotion Apply topically 2 times daily.       apixaban ANTICOAGULANT (ELIQUIS) 5 MG tablet Take 1 tablet (5 mg) by mouth 2 times daily.       levothyroxine (SYNTHROID/LEVOTHROID) 25 MCG tablet Take 1 tablet (25 mcg) by mouth every morning (before breakfast).         ROS:  4 point ROS including Respiratory, CV, GI and , other than that noted in the HPI,  is negative    Exam:  BP (!) 161/69   Pulse 79   Temp 98.1  F (36.7  C)   Resp 18   Ht 1.727 m (5' 8\")   Wt 122.5 kg (270 lb)   SpO2 99%   BMI 41.05 kg/m    Alert, pleasant, casually dressed, appears stated age  Moist dry mucosa  No scleral icterus  Heart tones regular at this time without murmur rub or gallop  Breathing unlabored on room air, no cough, clear posteriorly  Abdomen obese  Significant lymphedema of the bilateral lower extremities with Tubigrip's in place  Normal speech, no tremor, engaged in visit  Mood euthymic    Lab/Diagnostic data:  Most Recent 3 CBC's:  Recent Labs   Lab Test 04/24/25  0709 04/23/25  1553 10/24/20  2238   WBC 7.9 12.6* 7.6   HGB 11.4* 12.4 10.3*   MCV 93 94 85    172 198     Most Recent 3 BMP's:  Recent Labs   Lab Test 04/26/25  0627 04/25/25  0719 04/24/25  0709 04/23/25  1553 04/23/25  1553 10/24/20  2238   NA  --   --  141  --  141 139   POTASSIUM 3.9 3.6 4.0   < > 4.3 4.1   CHLORIDE  --   --  108*  --  107 109   CO2  --   --  20*  --  20* 24   BUN  --   --  16.3  --  16.6 17   CR  --   --  1.02*  --  1.17* 1.06*   ANIONGAP  --   --  13  --  14 6   MARTHA  --   --  9.2  --  9.3 8.4*   GLC  --   --  99  --  135* 125*    < > = values in this interval not displayed.   Estimated Creatinine Clearance: 70.7 mL/min (A) (based on SCr of 1.02 mg/dL (H)).    Most Recent 2 LFT's:No lab results found.  Most Recent TSH and T4:  Recent Labs   Lab Test 04/23/25  1553   TSH 24.51*   T4 0.87*     Most Recent Hemoglobin A1c:No " lab results found.    UA RESULTS:  Recent Labs   Lab Test 04/23/25  1802   COLOR Light Yellow   APPEARANCE Clear   URINEGLC Negative   URINEBILI Negative   URINEKETONE Trace*   SG 1.010   UBLD Small*   URINEPH 6.5   PROTEIN 20*   NITRITE Negative   LEUKEST Large*   RBCU 14*   WBCU 33*      TTE:  Interpretation Summary  1. Normal left ventricular size and systolic function. Estimated ejection  fraction is 60 to 65%.  2. Normal right ventricular size and systolic function.  3. Mild left atrial enlargement.  4. Mild calcific aortic valve stenosis. Aortic valve mean systolic gradient is  12 mmHg. Aortic valve area by Doppler is 1.7 cmÂ .  5. No prior exam available for comparison.    Hospital imaging:  CXR: IMPRESSION: Technically limited exam. Heart size is likely enlarged. Prominence of central pulmonary vasculature and increased interstitial density suspicious for early edema. No lobar consolidation, effusions, or pneumothorax. Advanced degenerative changes in both shoulders, left greater than right.    IMPRESSION:  HEAD CT:  1.  Superficial soft tissue contusion/subgaleal hematoma is seen along the posterior superior parietal scalp.  2.  No finding for intracranial hemorrhage, mass, or acute infarct.  3.  Mild volume loss and presumed sequela chronic microvascular ischemic change.     CERVICAL SPINE CT:  1.  Moderate to advanced cervical spondylosis without finding for acute fracture.  2.  Grade I anterolisthesis C3 on C4, C4 on C5 and C7 on T1. Listhesis at these levels is age-indeterminate but favored to be chronic and degenerative in etiology. Correlate for cervical pain.      ASSESSMENT/PLAN:  Recurrent falls  Physical deconditioning  She is so glad that she is working here at the TCU with therapy to gain strength that she had slowly lost over time at home  Was walking along the parallel bars a few steps prior to the weekend and is looking forward to what she will do with therapy today  Does have Tylenol for  generalized aches and pains usually only using 0 or 1 dose per day    Personal history of urinary tract infection  We talked about this as well and she recognizes she was treated for a UTI in the hospital with antibiotics  Says she had no symptoms of infection that she was aware of at that time  It is possible that some of her presenting weakness was related to multifactorial reasons as otherwise described and that bacteriuria was otherwise asymptomatic however, understand it was understandably reasonable to treat that with antimicrobial therapy in the hospital  No current signs or symptoms of infection    Morbid obesity (H)  Noted comorbidity that contributes to her overall health  Follow weight trend periodically with her now getting healthy nutritious meals at the facility and with treatment of her new diagnosed hypothyroidism  CMP and A1c on Monday 5/19/25 as no LFTs or A1c on file and could contribute to comorbidities    Hypothyroidism, unspecified type  Initiated on levothyroxine in the hospital  Follow-up with new PCP as an outpatient to follow trend and adjust dosing as needed    Atrial fibrillation with rapid ventricular response (H)  She is aware of this new diagnosis and has Zio patch in place  Has otherwise been asymptomatic  Heart tones do sound regular on exam today  She and I also spoke regarding the anticoagulant Eliquis which she is taking to reduce the risk of stroke  Rate is controlled  She does have h/o snoring but feels better controlled recently; asked she discuss with nursing staff overnight  She and I spoke re: association between afib and JUSTIN and she says she has family history of both so understands  May need JUSTIN eval as an outpatient if indicated    Elevated blood pressure reading without diagnosis of hypertension  Lymphedema  Venous stasis  She agreed to start hydrochlorothiazide 12.5 mg daily for lymphedema and elevated BPs  Also FRANKO diet  Continue TG which she has liked over the  moisturizing cream  No systemic symptoms of fluid overload and echo with normal EF  CMP and A1c on Monday 5/19/25            Electronically signed by:  Mallorie Avila DO      Sincerely,        Mallorie Avila DO    Electronically signed

## 2025-05-14 ENCOUNTER — LAB REQUISITION (OUTPATIENT)
Dept: LAB | Facility: CLINIC | Age: 70
End: 2025-05-14
Payer: COMMERCIAL

## 2025-05-14 DIAGNOSIS — E66.01 MORBID (SEVERE) OBESITY DUE TO EXCESS CALORIES (H): ICD-10-CM

## 2025-05-20 ENCOUNTER — DOCUMENTATION ONLY (OUTPATIENT)
Dept: GERIATRICS | Facility: CLINIC | Age: 70
End: 2025-05-20
Payer: COMMERCIAL

## 2025-05-20 ENCOUNTER — PATIENT OUTREACH (OUTPATIENT)
Dept: CARE COORDINATION | Facility: CLINIC | Age: 70
End: 2025-05-20
Payer: COMMERCIAL

## 2025-05-20 NOTE — PROGRESS NOTES
Per Mariangel Carrera PA-C   NP will follow home care orders until 6/11 when PCP will be established.   Niurka Wells RN on 5/20/2025 at 11:03 AM

## 2025-05-20 NOTE — PROGRESS NOTES
Connected Care Resource Center  TCU Discharge    Clinical Data: Per staff at [facility], patient has discharged from TCU to home/community setting.    Assessment/Plan: Transitional Care Management (TCM) program initiated to prompt post-discharge outreach call by CCRC team member.     Adore Lang  Connected Care Resource Center  [facility]

## 2025-05-21 ENCOUNTER — MEDICAL CORRESPONDENCE (OUTPATIENT)
Dept: HEALTH INFORMATION MANAGEMENT | Facility: CLINIC | Age: 70
End: 2025-05-21

## 2025-05-22 ENCOUNTER — RESULTS FOLLOW-UP (OUTPATIENT)
Dept: GERIATRICS | Facility: CLINIC | Age: 70
End: 2025-05-22

## 2025-05-22 ENCOUNTER — PATIENT OUTREACH (OUTPATIENT)
Dept: CARE COORDINATION | Facility: CLINIC | Age: 70
End: 2025-05-22
Payer: COMMERCIAL

## 2025-05-22 NOTE — LETTER
I am a team member within the [facility] [place] with [facility]. I hope you are doing well following a recent hospitalization or TCU stay. I would like to take this chance to introduce Clinic Care Coordination    Below is a description of Clinic Care Coordination and how this team can further assist you:       The Clinic Care Coordination team is made up of a Registered Nurse, , Financial Resource Worker, and a Community Health Worker who understand and can help navigate the health care system. The goal of clinic care coordination is to help you manage your health, improve access to care, and achieve optimal health outcomes. They work alongside your provider to assist you in determining your health and social needs, obtain health care and community resources, and provide you with necessary information and education. Clinic Care Coordination can work with you through any barriers and develop a care plan that helps coordinate and strengthen the relationship between you and your care team.    If you wish to connect with the Clinic Care Coordination Team, please let your [facility] Primary Care Provider or Clinic Care Team know and they can place a referral. The Clinic Care Coordination team will then reach out by phone to further support you.    We are focused on providing you with the highest-quality healthcare experience possible.    Sincerely,   Your care team with [facility]'s [address] [place] [address] ([phone]).

## 2025-05-22 NOTE — PROGRESS NOTES
Clinic Care Coordination Contact  Transitions of Care Outreach    Chief Complaint   Patient presents with    Clinic Care Coordination - Homecare/TCU       Most Recent Admission Date: 04/28/2025  Most Recent Admission Diagnosis: Mechanical fall, Generalized weakness, Sepsis secondary to UTI, Rapid atrial fibrillation &  Hypothyroidism.    Most Recent Discharge Date: 05/18/2025  Most Recent Discharge Diagnosis: Mechanical fall, Generalized weakness, Sepsis secondary to UTI, Rapid atrial fibrillation &  Hypothyroidism.      Transitions of Care Assessment    Discharge Assessment  How are you doing now that you are home?: Things are getting put into place. Things are getting better.  How are your symptoms? (Red Flag symptoms escalate to triage hotline per guidelines): Improved  Do you know how to contact your clinic care team if you have future questions or changes to your health status? : Yes  Does the patient have their discharge instructions? : Yes  Does the patient have questions regarding their discharge instructions? : No  Were you started on any new medications or were there changes to any of your previous medications? : Yes  Does the patient have all of their medications?: No (see comment) (She is picking them up later today.)  Do you have questions regarding any of your medications? : No  Do you have all of your needed medical supplies or equipment (DME)?  (i.e. oxygen tank, CPAP, cane, etc.): Yes              CCRC Explained and offered Care Coordination support to eligible patients: N/A    Patient accepted? N/A No [facility] PCP for this patient.     Follow up Plan     Discharge Follow-Up  Discharge follow up appointment scheduled in alignment with recommended follow up timeframe or Transitions of Risk Category? (Low = within 30 days; Moderate= within 14 days; High= within 7 days): Yes  Discharge Follow Up Appointment Date: 06/11/25  Discharge Follow Up Appointment Scheduled with?: Primary Care Provider    Future  Appointments   Date Time Provider Department Center   6/11/2025  8:00 AM Leslie Atwood MD [facility]       Outpatient Plan as outlined on AVS reviewed with patient.      For any urgent concerns, please contact our 24 hour nurse triage line: [phone]       Annmarie Alarcon  Ambulatory Care  - [facility]

## 2025-05-29 DIAGNOSIS — I89.0 LYMPHEDEMA: ICD-10-CM

## 2025-05-29 DIAGNOSIS — E66.01 MORBID OBESITY (H): Primary | ICD-10-CM

## 2025-05-29 DIAGNOSIS — R53.81 PHYSICAL DECONDITIONING: ICD-10-CM

## 2025-05-29 DIAGNOSIS — R29.6 RECURRENT FALLS: ICD-10-CM

## 2025-05-29 NOTE — PROGRESS NOTES
DME (Durable Medical Equipment) Orders and Documentation  Orders Placed This Encounter   Procedures    Tub Transfer Bench Order      The patient was assessed and it was determined the patient is in need of the following listed DME Supplies/Equipment. Please complete supporting documentation below to demonstrate medical necessity.

## 2025-06-02 DIAGNOSIS — Z53.9 DIAGNOSIS NOT YET DEFINED: Primary | ICD-10-CM

## 2025-06-02 PROCEDURE — G0180 MD CERTIFICATION HHA PATIENT: HCPCS | Performed by: PHYSICIAN ASSISTANT

## 2025-06-11 ENCOUNTER — PATIENT OUTREACH (OUTPATIENT)
Dept: CARE COORDINATION | Facility: CLINIC | Age: 70
End: 2025-06-11

## 2025-06-11 ENCOUNTER — VIRTUAL VISIT (OUTPATIENT)
Dept: PEDIATRICS | Facility: CLINIC | Age: 70
End: 2025-06-11
Payer: COMMERCIAL

## 2025-06-11 DIAGNOSIS — R26.89 DECREASED MOBILITY: ICD-10-CM

## 2025-06-11 DIAGNOSIS — D64.9 ANEMIA, UNSPECIFIED TYPE: ICD-10-CM

## 2025-06-11 DIAGNOSIS — M62.81 GENERALIZED MUSCLE WEAKNESS: Primary | ICD-10-CM

## 2025-06-11 DIAGNOSIS — I48.0 PAROXYSMAL ATRIAL FIBRILLATION (H): ICD-10-CM

## 2025-06-11 DIAGNOSIS — E03.9 HYPOTHYROIDISM, UNSPECIFIED TYPE: ICD-10-CM

## 2025-06-11 DIAGNOSIS — I10 ESSENTIAL HYPERTENSION: ICD-10-CM

## 2025-06-11 PROCEDURE — 98002 SYNCH AUDIO-VIDEO NEW MOD 45: CPT | Performed by: INTERNAL MEDICINE

## 2025-06-11 RX ORDER — HYDROCHLOROTHIAZIDE 12.5 MG/1
12.5 TABLET ORAL DAILY
Qty: 90 TABLET | Refills: 0 | Status: SHIPPED | OUTPATIENT
Start: 2025-06-11 | End: 2025-06-12

## 2025-06-11 NOTE — PROGRESS NOTES
RN attempted to reach patient, left voicemail to call back and speak with any triage nurse. Need to relay message that clinic is unable to e-prescribe prescription(s) to ANY [facility]acies at this time. This is related to the recent news of [facility] proactively disabling their computer systems related to an unauthorized hacking incident.      What is preferred alternative pharmacy for patient at this time?    Brant HANNA RN 6/11/2025 at 4:09 PM

## 2025-06-11 NOTE — PROGRESS NOTES
Clinic Care Coordination Contact  Community Health Worker Initial Outreach    CHW introduced self and role with CC  Patient states that she is doing okay, she is established with home care. She has already met with home care SW earlier this month and given resources.   She feels she has what she needs set up at this time.   CHW inquired if patient is in need of any additional support or resources however patient declines.  CHW provided contact information and encouraged patient to reach out with any future needs or concerns, patient agrees.    Patient accepts CC: No, patient declines outstanding need for CC at this time. Patient will be sent Care Coordination introduction letter for future reference.    Shelia ROGERS, B.S. [facility] Care Coordination  [facility]:  [place], [place] and [place]  [phone]  [email or web address]

## 2025-06-11 NOTE — PROGRESS NOTES
Gi is a 70 year old who is being evaluated via a billable video visit.    What phone number would you like to be contacted at? See demographics  How would you like to obtain your AVS?       Assessment & Plan     Generalized muscle weakness  Decreased mobility  Likely multifactorial in setting of hypothyroidism, recent illness/hospitalization, paroxysmal atrial fibrillation, and likely longer standing deconditioning. Will recheck TSH on levothyroxine, as well as Hgb given recent anemia during admission. Cardiology referral as below to determine if further Afib intervention warranted. Encouraged patient to continue to work closely with physical therapy. Plan for reassessment/follow-up in 2 weeks. If still home bound without a way to safely exit home at that time (she is looking into chair lift) would consider discussion with patient and care coordination about safety/placement. Of note, per patient edema is improving so will hold off on further diuresis.   - CBC with platelets; Future  - TSH with free T4 reflex; Future    Paroxysmal atrial fibrillation (H)  Patient with noted RVR while hospitalized and subsequent ZioPatch confirmed. On apixaban, given significant fatigue and weakness I do think it would be reasonable for her to follow-up with cardiology to discuss different management strategies as this may help with her other symptoms. Referral placed.   - apixaban ANTICOAGULANT (ELIQUIS) 5 MG tablet; Take 1 tablet (5 mg) by mouth 2 times daily.  - Adult Cardiology [facility] Referral; Future    Essential hypertension  Well controlled on hydrochlorothiazide, and edema seems to be doing well. Medication refilled. Recent labs reviewed and reassuring.   - hydroCHLOROthiazide 12.5 MG tablet; Take 1 tablet (12.5 mg) by mouth daily.    Hypothyroidism, unspecified type  On low dose levothyroxine - may account for her significant weakness and debility. Recheck levels.   - TSH with free T4 reflex; Future    Anemia,  "unspecified type  Noted on labs in hospital, checking labs as above.   - CBC with platelets; Future  - Ferritin; Future  - Iron and iron binding capacity; Future        MED REC REQUIRED  Post Medication Reconciliation Status: discharge medications reconciled and changed, per note/orders  BMI  Estimated body mass index is 40.08 kg/m  as calculated from the following:    Height as of 5/16/25: 1.727 m (5' 8\").    Weight as of 5/16/25: 119.6 kg (263 lb 9.6 oz).             Cristina Angelo is a 70 year old, presenting for the following health issues:  No chief complaint on file.      Video Start Time: switched to phone visit due to patient request - she does not have mychart.     HPI      Shortly after Constance had a fall and was having significant weakness, including difficulty ambulating and moving in bed even. Struggled significantly with ambulation even with walker.     Was ultimately admitted to [facility] and diagnosed with UTI w/ sepsis, hypothyroidism and atrial fibrillation with RVR. Was discharged to [facility] to start rehab, was there for about 2 weeks and was just starting to stand up/walk with a walker.    Needed 911 non-emergency to help her in her home as she couldn't navigate steps. Has a wheelchair at home that she uses to navigate at home. Can self transfer to bed and toilet. Has Homecare therapy coming in to see her.     Does have some strength back in her arms, and feels that she is getting there with her arms.     Patient feels that much of her symptoms are related to her thyroid.     Recent lab work reassuring. Reports that her swelling has improved somewhat since starting hydrochlorothiazide at [facility].     Is working with physical therapy/OT twice weekly now and home nursing once weekly. Does feel that exercises are helping her legs although she is really sore at the end of the day. She thinks that more of her issues around mobility are weakness related. She has a hard time straightening her legs. She doesn't " feel steady on her feet. Has a hx of bilateral knee surgery.     Is sleeping off and on, appetite hasn't been great.           Objective           Vitals:  No vitals were obtained today due to virtual visit.    Physical Exam   Not completed due to phone visit.           Video-Visit Details    Type of service:  Phone Visit   Phone call duration: 23 min    The longitudinal plan of care for the diagnosis(es)/condition(s) as documented were addressed during this visit. Due to the added complexity in care, I will continue to support Gi in the subsequent management and with ongoing continuity of care.      35 minutes spent by me on the date of the encounter doing chart review, history and exam, documentation and further activities per the note

## 2025-06-11 NOTE — LETTER
[facility] CARE COORDINATION  No address on file    June 11, 2025    Gi Zhao  [address]  [place] [address]      Dear Gi,    I am a clinic community health worker who works at the [facility]. I wanted to thank you for spending the time to talk with me.  Below is a description of clinic care coordination and how I can further assist you.       The clinic care coordination team is made up of a registered nurse, , financial resource worker and community health worker who understand the health care system. The goal of clinic care coordination is to help you manage your health and improve access to the health care system. Our team works alongside your provider to assist you in determining your health and social needs. We can help you obtain health care and community resources, providing you with necessary information and education. We can work with you through any barriers and develop a care plan that helps coordinate and strengthen the communication between you and your care team.  Our services are voluntary and are offered without charge to you personally.    Please feel free to contact me with any questions or concerns regarding care coordination and what we can offer.      We are focused on providing you with the highest-quality healthcare experience possible.    Sincerely,     Shelia ROGERS, B.S. [facility]  Clinic Care Coordination  [facility]:  [place], [place] and [place]  [phone]  [email or web address]

## 2025-06-12 ENCOUNTER — TELEPHONE (OUTPATIENT)
Dept: PEDIATRICS | Facility: CLINIC | Age: 70
End: 2025-06-12
Payer: COMMERCIAL

## 2025-06-12 DIAGNOSIS — I48.0 PAROXYSMAL ATRIAL FIBRILLATION (H): ICD-10-CM

## 2025-06-12 DIAGNOSIS — I10 ESSENTIAL HYPERTENSION: ICD-10-CM

## 2025-06-12 RX ORDER — HYDROCHLOROTHIAZIDE 12.5 MG/1
12.5 TABLET ORAL DAILY
Qty: 90 TABLET | Refills: 0 | Status: SHIPPED | OUTPATIENT
Start: 2025-06-12

## 2025-06-12 NOTE — TELEPHONE ENCOUNTER
MA/TC- please fax orders to [facility] and schedule pt for follow up apt in 2 weeks per Dr. Atwood message below.     Evelin Solorzano, RN     Pt uses [facility].   Phone: [phone]  Fax: [phone]    --- Leslie Atwood MD 6/12/2025 8:30 AM ---   Can we reach out to patient's homecare and fax lab orders from appointment yesterday? She also needs follow-up scheduled in 2 weeks. Please help make this in person (fine to use any block) and remind patient we can switch this to virtual if she doesn't think she can make it in to clinic.     Thanks!     Leslie Atwood MD  Internal Medicine-Pediatrics

## 2025-06-12 NOTE — TELEPHONE ENCOUNTER
Refill team- See pharmacy transfer request below.     Called and spoke with pt,     Informed pt that [facility] pharmacy is unable to receive prescriptions at this time and we are requesting an alternate pharmacy to sent prescriptions.       Clinic RN: Please investigate patient's chart or contact patient if the information cannot be found because [facility] pharmacies have experienced unauthorized activity and have proactively disabled their systems at some pharmacies.Contact the patient to get an alternate pharmacy. Update the pharmacy and send refill encounter back to the corresponding Refill pool or provider if it is controlled substance.     Pt verbalizes understanding of above and is agreeable with plan to have the prescriptions sent to the [facility] pharmacy. Pt denies any further questions or concerns at this time.     Leslie MURILLO RN   Clinic RN  [facility]

## 2025-06-12 NOTE — CONFIDENTIAL NOTE
Can we reach out to patient's homecare and fax lab orders from appointment yesterday? She also needs follow-up scheduled in 2 weeks. Please help make this in person (fine to use any block) and remind patient we can switch this to virtual if she doesn't think she can make it in to clinic.    Thanks!    Leslie Atwood MD  Internal Medicine-Pediatrics

## 2025-06-19 ENCOUNTER — TRANSFERRED RECORDS (OUTPATIENT)
Dept: HEALTH INFORMATION MANAGEMENT | Facility: CLINIC | Age: 70
End: 2025-06-19
Payer: COMMERCIAL

## 2025-06-19 LAB — TSH SERPL-ACNC: 22.4 UIU/ML (ref 0.27–4.2)

## 2025-06-30 ENCOUNTER — TELEPHONE (OUTPATIENT)
Dept: PEDIATRICS | Facility: CLINIC | Age: 70
End: 2025-06-30

## 2025-06-30 ENCOUNTER — VIRTUAL VISIT (OUTPATIENT)
Dept: PEDIATRICS | Facility: CLINIC | Age: 70
End: 2025-06-30
Payer: COMMERCIAL

## 2025-06-30 DIAGNOSIS — I10 ESSENTIAL HYPERTENSION: ICD-10-CM

## 2025-06-30 DIAGNOSIS — R53.1 WEAKNESS: ICD-10-CM

## 2025-06-30 DIAGNOSIS — R60.9 EDEMA, UNSPECIFIED TYPE: ICD-10-CM

## 2025-06-30 DIAGNOSIS — I48.0 PAROXYSMAL ATRIAL FIBRILLATION (H): ICD-10-CM

## 2025-06-30 DIAGNOSIS — I10 ESSENTIAL HYPERTENSION: Primary | ICD-10-CM

## 2025-06-30 DIAGNOSIS — E03.9 HYPOTHYROIDISM, UNSPECIFIED TYPE: Primary | ICD-10-CM

## 2025-06-30 DIAGNOSIS — M62.81 GENERALIZED MUSCLE WEAKNESS: ICD-10-CM

## 2025-06-30 DIAGNOSIS — R26.89 DECREASED MOBILITY: ICD-10-CM

## 2025-06-30 DIAGNOSIS — E03.9 HYPOTHYROIDISM, UNSPECIFIED TYPE: ICD-10-CM

## 2025-06-30 PROCEDURE — 98014 SYNCH AUDIO-ONLY EST MOD 30: CPT | Performed by: INTERNAL MEDICINE

## 2025-06-30 RX ORDER — HYDROCHLOROTHIAZIDE 12.5 MG/1
12.5 TABLET ORAL DAILY
Qty: 90 TABLET | Refills: 1 | Status: SHIPPED | OUTPATIENT
Start: 2025-06-30

## 2025-06-30 NOTE — TELEPHONE ENCOUNTER
Called [facility] at [phone] and spoke with Colleen (intake). Transferred to [facility] and inquired about lab results. [facility] states that they faxed the results to [facility] on 6/20/25 via fax number: [phone], however we did not receive them. RN gave fax number: [phone]- to fax the labs results. Will wait for the fax.     RN also updated [facility] that patient had appointment today and PCP restarted hydrochlorothiazide and that PCP wants to have home health care nurse retake BP at their next appointment and call us with readings next week.     Called Patient at [phone] and helped to schedule 2 month follow up appointment on 9/8/25 with PCP. Patient aware this is an in clinic visit.     Annmarie Garcia, RN  [facility]

## 2025-06-30 NOTE — CONFIDENTIAL NOTE
Please follow-up with patient/patient's home care. She had labs drawn on 6-20 but we do not have results - can we track these down, and if not, they will need to be repeated. Can we follow-up on patient's BPs since restarting hydrochlorothiazide (home care will be out there on Thursday)? Patient needs in person follow-up with me in about 2 months.     Thanks!    Leslie Atwood MD  Internal Medicine-Pediatrics

## 2025-06-30 NOTE — PROGRESS NOTES
"Gi is a 70 year old who is being evaluated via a billable telephone visit.    What phone number would you like to be contacted at? See demographics  How would you like to obtain your AVS? Mail a copy  Originating Location (pt. Location): Home    Distant Location (provider location):  On-site  Telephone visit completed due to the patient did not have access to video, while the distant provider did.    Assessment & Plan     Essential hypertension  Now back on hydrochlorothiazide, will need to follow-up on BPs through home care nursing and can titrate dosing as needed.   - hydroCHLOROthiazide 12.5 MG tablet; Take 1 tablet (12.5 mg) by mouth daily.    Edema, unspecified type  Improved on hydrochlorothiazide per patient report.     Paroxysmal atrial fibrillation (H)  Medication refilled, has follow-up scheduled with cardiology to discuss if additional interventions (such as ablation) would be helpful for management of her fatigue.   - apixaban ANTICOAGULANT (ELIQUIS) 5 MG tablet; Take 1 tablet (5 mg) by mouth 2 times daily.    Generalized muscle weakness  Decreased mobility  Continues to work with home physical therapy/OT. Per patient report this has been gradually improving. Apparently labs from last visit were done but do need follow-up as we don't have results.     Hypothyroidism, unspecified type  On levothyroxine. Will refill once we can clarify recent lab results.           BMI  Estimated body mass index is 40.08 kg/m  as calculated from the following:    Height as of 5/16/25: 1.727 m (5' 8\").    Weight as of 5/16/25: 119.6 kg (263 lb 9.6 oz).             Subjective   Gi is a 70 year old, presenting for the following health issues:  No chief complaint on file.    HPI      Gi calls in for follow-up from her virtual visit 2 weeks ago. Recently was reassessed by physical therapy - now planning to come out weekly and staying longer. She continues to do exercises, still getting around in wheelchair. Improving " strength in her upper extremities and does have some improvement in her lower extremity strength as well. Still doesn't think that she can safely navigate stairs.     Has still been having fatigue, particularly later in the afternoon.     BPs were higher but this is because she held the hydrochlorothiazide, but has now restarted. Only home care nurse is checking her BPs at this time, patient is not checking her own BPs. She does feel that swelling is ok but not too bad. Has been trying to elevate feet and not sit too long.     Labs done about 10 days ago (6-20).     Has cardiology appointment in Sept.           Objective           Vitals:  No vitals were obtained today due to virtual visit.    Physical Exam   General: Alert and no distress //Respiratory: No audible wheeze, cough, or shortness of breath // Psychiatric:  Appropriate affect, tone, and pace of words            Phone call duration: 11 minutes  Signed Electronically by: Leslie Sykes MD

## 2025-07-02 DIAGNOSIS — I48.0 PAROXYSMAL ATRIAL FIBRILLATION (H): ICD-10-CM

## 2025-07-02 DIAGNOSIS — I10 ESSENTIAL HYPERTENSION: ICD-10-CM

## 2025-07-02 RX ORDER — HYDROCHLOROTHIAZIDE 12.5 MG/1
12.5 TABLET ORAL DAILY
Qty: 90 TABLET | Refills: 1 | OUTPATIENT
Start: 2025-07-02

## 2025-07-02 NOTE — TELEPHONE ENCOUNTER
Contacts       Contact Date/Time Type Contact Phone/Fax    06/30/2025 02:38 PM CDT Phone (Outgoing) [facility] [employer]. [phone]    06/30/2025 02:48 PM CDT Phone (Outgoing) Gi Zhao (Self) [phone] (H)    Talked with Patient           Attempted to reach patient to: Discuss test results    I called home care and was transferred to a supervisor named Bryanna, I left voice mail asking for the lab results from 6/20 to be faxed to us, left [facility] fax number or mailed to us, gave the [facility] address.

## 2025-07-03 RX ORDER — LEVOTHYROXINE SODIUM 25 UG/1
25 TABLET ORAL
Qty: 90 TABLET | Status: CANCELLED | OUTPATIENT
Start: 2025-07-03

## 2025-07-03 NOTE — TELEPHONE ENCOUNTER
Received call from [facility] Care Nurse to report patient's BP from today's visit: /86 HR 87    Of note - Home Care Nurse reports patient did not stop hydrochlorothiazide, she previously had stopped levothyroxine. Patient has resumed and is going to be out of medication in a couple days. Still waiting on labs to be faxed, Angelo states she will request labs to be faxed now.    Provider - pended levothyroxine refill. Previously listed as transitional. Please review and sign if appropriate.    Loyda Booker RN

## 2025-07-03 NOTE — TELEPHONE ENCOUNTER
Labs were just faxed to us.  Sending them to abstracting and giving them to nurse at station D to review.  Putting this on the providers desk for review.

## 2025-07-07 ENCOUNTER — TELEPHONE (OUTPATIENT)
Dept: PEDIATRICS | Facility: CLINIC | Age: 70
End: 2025-07-07
Payer: COMMERCIAL

## 2025-07-07 RX ORDER — HYDROCHLOROTHIAZIDE 25 MG/1
25 TABLET ORAL DAILY
Qty: 90 TABLET | Refills: 0 | Status: SHIPPED | OUTPATIENT
Start: 2025-07-07

## 2025-07-07 RX ORDER — LEVOTHYROXINE SODIUM 50 UG/1
50 TABLET ORAL
Qty: 90 TABLET | Refills: 0 | Status: SHIPPED | OUTPATIENT
Start: 2025-07-07

## 2025-07-07 NOTE — TELEPHONE ENCOUNTER
Received a call from the patient   - Patient states that she had labs drawn from her home health care nurse on 6/30/2025 and is wondering what dose she should be taking of her levothyroxine medication   - Patient's preferred pharmacy: [facility]     Upon chart review:   - See the 6/30/2025 Telephone Encounter   - Do not see the lab results abstracted into the patient's chart at this time      levothyroxine (SYNTHROID/LEVOTHROID) 25 MCG tablet -- -- 4/27/2025 -- No   Sig - Route: Take 1 tablet (25 mcg) by mouth every morning (before breakfast). - Oral     Dr. Atwood, please review and advise.     Paola MCCLAIN RN   [facility]

## 2025-07-07 NOTE — TELEPHONE ENCOUNTER
Notified JANAK Stephens with [employer]. Faxed over lab orders and new medication list to [phone]. Placed hard copies at station D.

## 2025-07-07 NOTE — TELEPHONE ENCOUNTER
Labs reviewed. This helps explain why TSH markedly elevated with T4 in normal range. Let's have patient increase levothyroxine to 50mcg daily and recheck TSH in 4 months. Let's also increase hydrochlorothiazide to 25mg (new prescription sent), needs BP recheck in 1 week and BMP in 2 weeks. These have all been ordered.    Leslie Atwood MD  Internal Medicine-Pediatrics

## 2025-07-08 ENCOUNTER — TELEPHONE (OUTPATIENT)
Dept: PEDIATRICS | Facility: CLINIC | Age: 70
End: 2025-07-08
Payer: COMMERCIAL

## 2025-07-08 NOTE — TELEPHONE ENCOUNTER
Forms/Letter Request    Type of form/letter: OTHER: Order # [id number]       Do we have the form/letter: Yes: Form is on the provider's desk for review      Who is the form from? Home Health care    Where did/will the form come from? form was faxed in    How would you like the form/letter returned: Fax : [phone]

## 2025-07-09 ENCOUNTER — MEDICAL CORRESPONDENCE (OUTPATIENT)
Dept: HEALTH INFORMATION MANAGEMENT | Facility: CLINIC | Age: 70
End: 2025-07-09
Payer: COMMERCIAL

## 2025-07-15 ENCOUNTER — MEDICAL CORRESPONDENCE (OUTPATIENT)
Dept: HEALTH INFORMATION MANAGEMENT | Facility: CLINIC | Age: 70
End: 2025-07-15
Payer: COMMERCIAL

## 2025-07-16 ENCOUNTER — TELEPHONE (OUTPATIENT)
Dept: PEDIATRICS | Facility: CLINIC | Age: 70
End: 2025-07-16
Payer: COMMERCIAL

## 2025-07-16 NOTE — TELEPHONE ENCOUNTER
Forms/Letter Request    Type of form/letter: OTHER: order # [id number]       Do we have the form/letter: Yes: Form is on the provider's desk for review      Who is the form from? Home Health care    Where did/will the form come from? form was faxed in    How would you like the form/letter returned: Fax : [phone]

## 2025-07-21 ENCOUNTER — TELEPHONE (OUTPATIENT)
Dept: PEDIATRICS | Facility: CLINIC | Age: 70
End: 2025-07-21
Payer: COMMERCIAL

## 2025-07-21 NOTE — TELEPHONE ENCOUNTER
Forms/Letter Request    Type of form/letter: OTHER: Order # [id number]        Do we have the form/letter: Yes: Form is on the provider's desk for review      Who is the form from? Home Health care    Where did/will the form come from? form was faxed in    How would you like the form/letter returned: Fax : [phone]